# Patient Record
Sex: FEMALE | Race: WHITE | NOT HISPANIC OR LATINO | Employment: FULL TIME | URBAN - METROPOLITAN AREA
[De-identification: names, ages, dates, MRNs, and addresses within clinical notes are randomized per-mention and may not be internally consistent; named-entity substitution may affect disease eponyms.]

---

## 2019-01-23 ENCOUNTER — HOSPITAL ENCOUNTER (OUTPATIENT)
Dept: MAMMOGRAPHY | Facility: HOSPITAL | Age: 44
Discharge: HOME | End: 2019-01-23
Attending: OBSTETRICS & GYNECOLOGY

## 2019-02-22 ENCOUNTER — HOSPITAL ENCOUNTER (OUTPATIENT)
Dept: OTHER | Facility: HOSPITAL | Age: 44
Discharge: HOME | End: 2019-02-22

## 2019-02-22 LAB
ALBUMIN SERPL-MCNC: 4.2 G/DL (ref 3.5–5)
ALBUMIN/GLOB SERPL: 1.6 {RATIO} (ref 1.4–2.6)
ALP SERPL-CCNC: 33 U/L (ref 42–98)
ALT SERPL-CCNC: 8 U/L (ref 10–40)
ANION GAP SERPL CALC-SCNC: 14 MMOL/L (ref 8–19)
AST SERPL-CCNC: 13 U/L (ref 15–50)
BASOPHILS # BLD AUTO: 0.03 10*3/UL (ref 0–0.2)
BASOPHILS NFR BLD AUTO: 0.4 % (ref 0–3)
BILIRUB SERPL-MCNC: 0.64 MG/DL (ref 0.2–1.3)
BUN SERPL-MCNC: 11 MG/DL (ref 5–25)
BUN/CREAT SERPL: 14 {RATIO} (ref 6–20)
CALCIUM SERPL-MCNC: 8.9 MG/DL (ref 8.7–10.4)
CHLORIDE SERPL-SCNC: 103 MMOL/L (ref 99–111)
CHOLEST SERPL-MCNC: 173 MG/DL (ref 107–200)
CHOLEST/HDLC SERPL: 2.7 {RATIO} (ref 3–6)
CONV ABS IMM GRAN: 0.01 10*3/UL (ref 0–0.2)
CONV CO2: 25 MMOL/L (ref 22–32)
CONV IMMATURE GRAN: 0.1 % (ref 0–1.8)
CONV TOTAL PROTEIN: 6.9 G/DL (ref 6.3–8.2)
CREAT UR-MCNC: 0.79 MG/DL (ref 0.5–0.9)
DEPRECATED RDW RBC AUTO: 39.5 FL (ref 36.4–46.3)
EOSINOPHIL # BLD AUTO: 0.17 10*3/UL (ref 0–0.7)
EOSINOPHIL # BLD AUTO: 2.4 % (ref 0–7)
ERYTHROCYTE [DISTWIDTH] IN BLOOD BY AUTOMATED COUNT: 11.6 % (ref 11.7–14.4)
GFR SERPLBLD BASED ON 1.73 SQ M-ARVRAT: >60 ML/MIN/{1.73_M2}
GLOBULIN UR ELPH-MCNC: 2.7 G/DL (ref 2–3.5)
GLUCOSE SERPL-MCNC: 95 MG/DL (ref 65–99)
HBA1C MFR BLD: 13.9 G/DL (ref 12–16)
HCT VFR BLD AUTO: 41.6 % (ref 37–47)
HDLC SERPL-MCNC: 63 MG/DL (ref 40–60)
LDLC SERPL CALC-MCNC: 90 MG/DL (ref 70–100)
LYMPHOCYTES # BLD AUTO: 2.18 10*3/UL (ref 1–5)
MCH RBC QN AUTO: 30.8 PG (ref 27–31)
MCHC RBC AUTO-ENTMCNC: 33.4 G/DL (ref 33–37)
MCV RBC AUTO: 92.2 FL (ref 81–99)
MONOCYTES # BLD AUTO: 0.57 10*3/UL (ref 0.2–1.2)
MONOCYTES NFR BLD AUTO: 8.2 % (ref 3–10)
NEUTROPHILS # BLD AUTO: 4 10*3/UL (ref 2–8)
NEUTROPHILS NFR BLD AUTO: 57.6 % (ref 30–85)
NRBC CBCN: 0 % (ref 0–0.7)
OSMOLALITY SERPL CALC.SUM OF ELEC: 285 MOSM/KG (ref 273–304)
PLATELET # BLD AUTO: 300 10*3/UL (ref 130–400)
PMV BLD AUTO: 10.2 FL (ref 9.4–12.3)
POTASSIUM SERPL-SCNC: 3.8 MMOL/L (ref 3.5–5.3)
RBC # BLD AUTO: 4.51 10*6/UL (ref 4.2–5.4)
SODIUM SERPL-SCNC: 138 MMOL/L (ref 135–147)
TRIGL SERPL-MCNC: 102 MG/DL (ref 40–150)
TSH SERPL-ACNC: 2.55 M[IU]/L (ref 0.27–4.2)
VARIANT LYMPHS NFR BLD MANUAL: 31.3 % (ref 20–45)
VLDLC SERPL-MCNC: 20 MG/DL (ref 5–37)
WBC # BLD AUTO: 6.96 10*3/UL (ref 4.8–10.8)

## 2019-03-07 ENCOUNTER — HOSPITAL ENCOUNTER (OUTPATIENT)
Dept: OTHER | Facility: HOSPITAL | Age: 44
Discharge: HOME | End: 2019-03-07
Attending: NURSE PRACTITIONER

## 2020-02-07 ENCOUNTER — HOSPITAL ENCOUNTER (OUTPATIENT)
Dept: OTHER | Facility: HOSPITAL | Age: 45
Discharge: HOME | End: 2020-02-07

## 2020-02-07 LAB
ALBUMIN SERPL-MCNC: 4.4 G/DL (ref 3.5–5)
ALBUMIN/GLOB SERPL: 1.5 {RATIO} (ref 1.4–2.6)
ALP SERPL-CCNC: 46 U/L (ref 42–98)
ALT SERPL-CCNC: 17 U/L (ref 10–40)
ANION GAP SERPL CALC-SCNC: 18 MMOL/L (ref 8–19)
AST SERPL-CCNC: 16 U/L (ref 15–50)
BASOPHILS # BLD AUTO: 0.06 10*3/UL (ref 0–0.2)
BASOPHILS NFR BLD AUTO: 0.8 % (ref 0–3)
BILIRUB SERPL-MCNC: 0.6 MG/DL (ref 0.2–1.3)
BUN SERPL-MCNC: 12 MG/DL (ref 5–25)
BUN/CREAT SERPL: 13 {RATIO} (ref 6–20)
CALCIUM SERPL-MCNC: 9.8 MG/DL (ref 8.7–10.4)
CHLORIDE SERPL-SCNC: 100 MMOL/L (ref 99–111)
CHOLEST SERPL-MCNC: 151 MG/DL (ref 107–200)
CHOLEST/HDLC SERPL: 2.3 {RATIO} (ref 3–6)
CONV ABS IMM GRAN: 0.03 10*3/UL (ref 0–0.2)
CONV CO2: 25 MMOL/L (ref 22–32)
CONV IMMATURE GRAN: 0.4 % (ref 0–1.8)
CONV TOTAL PROTEIN: 7.3 G/DL (ref 6.3–8.2)
CREAT UR-MCNC: 0.94 MG/DL (ref 0.5–0.9)
DEPRECATED RDW RBC AUTO: 38.5 FL (ref 36.4–46.3)
EOSINOPHIL # BLD AUTO: 0.24 10*3/UL (ref 0–0.7)
EOSINOPHIL # BLD AUTO: 3.3 % (ref 0–7)
ERYTHROCYTE [DISTWIDTH] IN BLOOD BY AUTOMATED COUNT: 11.5 % (ref 11.7–14.4)
GFR SERPLBLD BASED ON 1.73 SQ M-ARVRAT: >60 ML/MIN/{1.73_M2}
GLOBULIN UR ELPH-MCNC: 2.9 G/DL (ref 2–3.5)
GLUCOSE SERPL-MCNC: 89 MG/DL (ref 65–99)
HCT VFR BLD AUTO: 42.4 % (ref 37–47)
HDLC SERPL-MCNC: 66 MG/DL (ref 40–60)
HGB BLD-MCNC: 14 G/DL (ref 12–16)
LDLC SERPL CALC-MCNC: 70 MG/DL (ref 70–100)
LYMPHOCYTES # BLD AUTO: 2.88 10*3/UL (ref 1–5)
LYMPHOCYTES NFR BLD AUTO: 39.2 % (ref 20–45)
MCH RBC QN AUTO: 29.9 PG (ref 27–31)
MCHC RBC AUTO-ENTMCNC: 33 G/DL (ref 33–37)
MCV RBC AUTO: 90.6 FL (ref 81–99)
MONOCYTES # BLD AUTO: 0.63 10*3/UL (ref 0.2–1.2)
MONOCYTES NFR BLD AUTO: 8.6 % (ref 3–10)
NEUTROPHILS # BLD AUTO: 3.5 10*3/UL (ref 2–8)
NEUTROPHILS NFR BLD AUTO: 47.7 % (ref 30–85)
NRBC CBCN: 0 % (ref 0–0.7)
OSMOLALITY SERPL CALC.SUM OF ELEC: 287 MOSM/KG (ref 273–304)
PLATELET # BLD AUTO: 336 10*3/UL (ref 130–400)
PMV BLD AUTO: 10.7 FL (ref 9.4–12.3)
POTASSIUM SERPL-SCNC: 4 MMOL/L (ref 3.5–5.3)
RBC # BLD AUTO: 4.68 10*6/UL (ref 4.2–5.4)
SODIUM SERPL-SCNC: 139 MMOL/L (ref 135–147)
TRIGL SERPL-MCNC: 75 MG/DL (ref 40–150)
TSH SERPL-ACNC: 1.79 M[IU]/L (ref 0.27–4.2)
VLDLC SERPL-MCNC: 15 MG/DL (ref 5–37)
WBC # BLD AUTO: 7.34 10*3/UL (ref 4.8–10.8)

## 2020-05-14 ENCOUNTER — HOSPITAL ENCOUNTER (OUTPATIENT)
Dept: OTHER | Facility: HOSPITAL | Age: 45
Discharge: HOME | End: 2020-05-14
Attending: NURSE PRACTITIONER

## 2020-05-14 LAB
25(OH)D3 SERPL-MCNC: 34.6 NG/ML (ref 30–100)
FERRITIN SERPL-MCNC: 24 NG/ML (ref 10–200)
FOLATE SERPL-MCNC: 14.2 NG/ML (ref 4.8–20)
FSH SERPL-ACNC: 3 M[IU]/ML
IRON SATN MFR SERPL: 13 % (ref 20–55)
IRON SERPL-MCNC: 51 UG/DL (ref 60–170)
LH SERPL-ACNC: 1.5 M[IU]/ML
TIBC SERPL-MCNC: 408 UG/DL (ref 245–450)
TRANSFERRIN SERPL-MCNC: 285 MG/DL (ref 250–380)
TSH SERPL-ACNC: 1.92 M[IU]/L (ref 0.27–4.2)
VIT B12 SERPL-MCNC: 349 PG/ML (ref 211–911)

## 2020-05-15 LAB — ESTRADIOL SERPL HS-MCNC: 237 PG/ML

## 2020-07-02 ENCOUNTER — HOSPITAL ENCOUNTER (OUTPATIENT)
Dept: MAMMOGRAPHY | Facility: HOSPITAL | Age: 45
Discharge: HOME | End: 2020-07-02
Attending: NURSE PRACTITIONER

## 2020-08-03 ENCOUNTER — HOSPITAL ENCOUNTER (OUTPATIENT)
Dept: OTHER | Facility: HOSPITAL | Age: 45
Discharge: HOME | End: 2020-08-03
Attending: NURSE PRACTITIONER

## 2020-08-03 ENCOUNTER — CONVERSION ENCOUNTER (OUTPATIENT)
Dept: FAMILY MEDICINE CLINIC | Age: 45
End: 2020-08-03

## 2020-08-04 ENCOUNTER — CONVERSION ENCOUNTER (OUTPATIENT)
Dept: FAMILY MEDICINE CLINIC | Age: 45
End: 2020-08-04

## 2020-08-04 LAB — SARS-COV-2 RNA SPEC QL NAA+PROBE: NOT DETECTED

## 2020-08-05 ENCOUNTER — HOSPITAL ENCOUNTER (OUTPATIENT)
Dept: OTHER | Facility: HOSPITAL | Age: 45
Discharge: HOME | End: 2020-08-05
Attending: NURSE PRACTITIONER

## 2020-08-05 LAB — SARS-COV-2 RNA SPEC QL NAA+PROBE: NOT DETECTED

## 2020-12-24 ENCOUNTER — HOSPITAL ENCOUNTER (OUTPATIENT)
Dept: OTHER | Facility: HOSPITAL | Age: 45
Discharge: HOME | End: 2020-12-24
Attending: INTERNAL MEDICINE

## 2021-01-05 ENCOUNTER — OFFICE VISIT CONVERTED (OUTPATIENT)
Dept: GASTROENTEROLOGY | Facility: CLINIC | Age: 46
End: 2021-01-05
Attending: NURSE PRACTITIONER

## 2021-01-14 ENCOUNTER — HOSPITAL ENCOUNTER (OUTPATIENT)
Dept: OTHER | Facility: HOSPITAL | Age: 46
Discharge: HOME | End: 2021-01-14
Attending: NURSE PRACTITIONER

## 2021-01-16 LAB — SARS-COV-2 RNA SPEC QL NAA+PROBE: NOT DETECTED

## 2021-01-25 ENCOUNTER — HOSPITAL ENCOUNTER (OUTPATIENT)
Dept: PREADMISSION TESTING | Facility: HOSPITAL | Age: 46
Discharge: HOME | End: 2021-01-25
Attending: OBSTETRICS & GYNECOLOGY

## 2021-01-26 LAB — SARS-COV-2 RNA SPEC QL NAA+PROBE: NOT DETECTED

## 2021-01-29 ENCOUNTER — HOSPITAL ENCOUNTER (OUTPATIENT)
Dept: PERIOP | Facility: HOSPITAL | Age: 46
Setting detail: HOSPITAL OUTPATIENT SURGERY
Discharge: HOME | End: 2021-01-29
Attending: OBSTETRICS & GYNECOLOGY

## 2021-01-29 LAB — HCG UR QL: NEGATIVE

## 2021-02-05 ENCOUNTER — HOSPITAL ENCOUNTER (OUTPATIENT)
Dept: VACCINE CLINIC | Facility: HOSPITAL | Age: 46
Discharge: HOME | End: 2021-02-05
Attending: INTERNAL MEDICINE

## 2021-03-05 ENCOUNTER — HOSPITAL ENCOUNTER (OUTPATIENT)
Dept: OTHER | Facility: HOSPITAL | Age: 46
Discharge: HOME | End: 2021-03-05

## 2021-03-05 LAB
ALBUMIN SERPL-MCNC: 4.8 G/DL (ref 3.5–5)
ALBUMIN/GLOB SERPL: 1.8 {RATIO} (ref 1.4–2.6)
ALP SERPL-CCNC: 45 U/L (ref 42–98)
ALT SERPL-CCNC: 18 U/L (ref 10–40)
ANION GAP SERPL CALC-SCNC: 14 MMOL/L (ref 8–19)
AST SERPL-CCNC: 18 U/L (ref 15–50)
BASOPHILS # BLD AUTO: 0.05 10*3/UL (ref 0–0.2)
BASOPHILS NFR BLD AUTO: 0.6 % (ref 0–3)
BILIRUB SERPL-MCNC: 0.45 MG/DL (ref 0.2–1.3)
BUN SERPL-MCNC: 13 MG/DL (ref 5–25)
BUN/CREAT SERPL: 14 {RATIO} (ref 6–20)
CALCIUM SERPL-MCNC: 9.6 MG/DL (ref 8.7–10.4)
CHLORIDE SERPL-SCNC: 103 MMOL/L (ref 99–111)
CHOLEST SERPL-MCNC: 161 MG/DL (ref 107–200)
CHOLEST/HDLC SERPL: 2.6 {RATIO} (ref 3–6)
CONV ABS IMM GRAN: 0.03 10*3/UL (ref 0–0.2)
CONV CO2: 25 MMOL/L (ref 22–32)
CONV IMMATURE GRAN: 0.4 % (ref 0–1.8)
CONV TOTAL PROTEIN: 7.5 G/DL (ref 6.3–8.2)
CREAT UR-MCNC: 0.96 MG/DL (ref 0.5–0.9)
DEPRECATED RDW RBC AUTO: 39.7 FL (ref 36.4–46.3)
EOSINOPHIL # BLD AUTO: 0.15 10*3/UL (ref 0–0.7)
EOSINOPHIL # BLD AUTO: 1.9 % (ref 0–7)
ERYTHROCYTE [DISTWIDTH] IN BLOOD BY AUTOMATED COUNT: 11.9 % (ref 11.7–14.4)
GFR SERPLBLD BASED ON 1.73 SQ M-ARVRAT: >60 ML/MIN/{1.73_M2}
GLOBULIN UR ELPH-MCNC: 2.7 G/DL (ref 2–3.5)
GLUCOSE SERPL-MCNC: 87 MG/DL (ref 65–99)
HCT VFR BLD AUTO: 41.9 % (ref 37–47)
HDLC SERPL-MCNC: 63 MG/DL (ref 40–60)
HGB BLD-MCNC: 14 G/DL (ref 12–16)
LDLC SERPL CALC-MCNC: 83 MG/DL (ref 70–100)
LYMPHOCYTES # BLD AUTO: 2.32 10*3/UL (ref 1–5)
LYMPHOCYTES NFR BLD AUTO: 29 % (ref 20–45)
MCH RBC QN AUTO: 30.3 PG (ref 27–31)
MCHC RBC AUTO-ENTMCNC: 33.4 G/DL (ref 33–37)
MCV RBC AUTO: 90.7 FL (ref 81–99)
MONOCYTES # BLD AUTO: 0.78 10*3/UL (ref 0.2–1.2)
MONOCYTES NFR BLD AUTO: 9.7 % (ref 3–10)
NEUTROPHILS # BLD AUTO: 4.68 10*3/UL (ref 2–8)
NEUTROPHILS NFR BLD AUTO: 58.4 % (ref 30–85)
NRBC CBCN: 0 % (ref 0–0.7)
OSMOLALITY SERPL CALC.SUM OF ELEC: 285 MOSM/KG (ref 273–304)
PLATELET # BLD AUTO: 304 10*3/UL (ref 130–400)
PMV BLD AUTO: 10.3 FL (ref 9.4–12.3)
POTASSIUM SERPL-SCNC: 4.4 MMOL/L (ref 3.5–5.3)
RBC # BLD AUTO: 4.62 10*6/UL (ref 4.2–5.4)
SODIUM SERPL-SCNC: 138 MMOL/L (ref 135–147)
TRIGL SERPL-MCNC: 75 MG/DL (ref 40–150)
TSH SERPL-ACNC: 1.45 M[IU]/L (ref 0.27–4.2)
VLDLC SERPL-MCNC: 15 MG/DL (ref 5–37)
WBC # BLD AUTO: 8.01 10*3/UL (ref 4.8–10.8)

## 2021-05-11 NOTE — H&P
History and Physical      Patient Name: Conchita Page   Patient ID: [id number]   Sex: Female   YOB: 1975    Primary Care Provider: Luisa PAGAN   Referring Provider: Luisa PAGAN    Visit Date: January 5, 2021    Provider: LATASHA Velasco   Location: [facility]   Location Address: [address] [place] [address], [address]  [place], [place]  [id number]   Location Phone: [phone]          Chief Complaint  · Dysphagia  · GERD      History Of Present Illness  The patient is a 45 year old /White female who presents on referral from Luisa PAGAN for a gastroenterology evaluation.      She presents for evaluation of dysphagia.  States that this has been present for over 10 years.      Dysphagia is worse with meats and breads.  States that this takes her breath when this occurs.  Admits regurgitation of food on two occasions.      Admits frequent heartburn.  This is improved since taking omeprazole and protonix routinely.  Has also noticed hoarseness of voice. She's now taking protonix 40 mg and then Pepcid 20 mg PRN.      Admits some nausea since having COVID vaccine.  She describes this to be constant.  Improved with anything.      Reports an increase in belching that's worse at bedtime.  States that she often regurgitates a white foamy substance.      Evaluated in the ER previously and prescribed Carafate.      Denies previous EGD.    Family medical history - mother with esophageal stricture.    6/4/2020 right upper quadrant ultrasound-small cyst in the right hepatic lobe.  Contracted gallbladder.  Normal common bile duct.         Past Medical History  Dysphagia; Fracture; Heel pain, bilateral; Numbness in feet; Vomiting alone         Past Surgical History  Appendectomy; Back surgery; C section         Medication List  [place] 24 Fe 1 mg-20 mcg (24)/75 mg (4) oral tablet; omeprazole 20 mg oral capsule,delayed release(DR/EC); Protonix 40 mg oral  "tablet,delayed release (DR/EC)         Allergy List  NO KNOWN DRUG ALLERGIES; Shellfish allergy         Family Medical History  Lung cancer         Social History  Alcohol (Never); Tobacco (Never)         Review of Systems  · Constitutional  o Denies  o : chills, fever  · Eyes  o Denies  o : blurred vision, changes in vision  · Cardiovascular  o Denies  o : chest pain, irregular heart beats  · Respiratory  o Denies  o : shortness of breath, cough  · Gastrointestinal  o Admits  o : See HPI  · Genitourinary  o Denies  o : dysuria, blood in urine  · Integument  o Denies  o : rash, new skin lesions  · Neurologic  o Denies  o : tingling or numbness, seizures  · Musculoskeletal  o Denies  o : joint pain, joint swelling  · Endocrine  o Denies  o : weight gain, weight loss  · Psychiatric  o Denies  o : anxiety, depression      Vitals  Date Time BP Position Site L\R Cuff Size HR RR TEMP (F) WT  HT  BMI kg/m2 BSA m2 O2 Sat FR L/min FiO2 HC       01/05/2021 01:25 /74 Sitting    73 - R  97.3 128lbs 0oz 5'  4\" 21.97 1.62             Physical Examination  · Constitutional  o Appearance  o : well developed, well-nourished, in no acute distress  · Eyes  o Vision  o :   § Visual Fields  § : eyes move symmetrical in all directions  o Sclerae  o : anicteric  o Pupils and Irises  o : pupils equal and symmetrical  · Neck  o Inspection/Palpation  o : supple  · Respiratory  o Respiratory Effort  o : breathing unlabored  o Inspection of Chest  o : normal appearance, no retractions  o Auscultation of Lungs  o : clear to auscultation bilaterally  · Cardiovascular  o Heart  o :   § Auscultation of Heart  § : no murmurs, gallops or rubs  · Gastrointestinal  o Abdominal Examination  o : soft, nontender to palpation, with normal active bowel sounds, no appreciable hepatosplenomegaly  o Digital Rectal Exam  o : deferred  · Lymphatic  o Neck  o : no palpable lymphadenopathy  · Skin and Subcutaneous Tissue  o General Inspection  o : without " focal lesions; turgor is normal  · Psychiatric  o General  o : Alert and oriented x3  o Mood and Affect  o : Mood and affect are appropriate to circumstances  · Extremities  o Extremities  o : No edema, no cyanosis          Assessment  · Pre-op testing     V72.84/Z01.818  · Pharyngeal dysphagia     787.23/R13.13  · Heartburn     787.1/R12  · Nausea alone     787.02/R11.0      Plan  · Orders  o [facility] Pre-Op Covid-19 Screening (35007) - - 01/05/2021  o Consent for Esophagogastroduodenoscopy (EGD) - Possible risks/complications, benefits, and alternatives to surgical or invasive procedure have been explained to patient and/or legal guardian. - Patient has been evaluated and can tolerate anesthesia and/or sedation. Risks, benefits, and alternatives to anesthesia and sedation have been explained to patient and/or legal guardian. (83876) - - 01/05/2021  · Medications  o Levsin/SL 0.125 mg sublingual tablet, sublingual   SIG: place 1 tablet under tongue by translingual route 2 times a day as needed   DISP: (60) Tablet with 2 refills  Prescribed on 01/05/2021     o Protonix 40 mg oral tablet,delayed release (DR/EC)   SIG: take 1 tablet by oral route daily   DISP: (90) Tablet with 1 refills  Prescribed on 01/05/2021     o Medications have been Reconciled  · Instructions  o Handouts provided: Pre-procedure instructions including date and time and location of procedure.  o PLAN: Proceed with procedure. Patient understands risks and benefits and is willing to proceed. Understands the risks include, but are not limited to, bleeding and/or perforation.  o Information given on current diagnoses.  o Electronically Identified Patient Education Materials Provided Electronically  · Disposition  o Follow Up after procedure            Electronically Signed by: LATASHA Velasco -Author on January 6, 2021 04:57:19 PM

## 2021-05-14 VITALS
DIASTOLIC BLOOD PRESSURE: 74 MMHG | HEIGHT: 64 IN | HEART RATE: 73 BPM | WEIGHT: 128 LBS | SYSTOLIC BLOOD PRESSURE: 111 MMHG | TEMPERATURE: 97.3 F | BODY MASS INDEX: 21.85 KG/M2

## 2021-07-02 VITALS — TEMPERATURE: 98 F | TEMPERATURE: 98.1 F

## 2021-07-21 ENCOUNTER — PREP FOR SURGERY (OUTPATIENT)
Dept: OTHER | Facility: HOSPITAL | Age: 46
End: 2021-07-21

## 2021-07-21 DIAGNOSIS — R12 HEARTBURN: ICD-10-CM

## 2021-07-21 DIAGNOSIS — R11.0 NAUSEA: ICD-10-CM

## 2021-07-21 DIAGNOSIS — R13.13 PHARYNGEAL DYSPHAGIA: Primary | ICD-10-CM

## 2021-07-27 PROBLEM — R12 HEARTBURN: Status: ACTIVE | Noted: 2021-07-27

## 2021-07-27 PROBLEM — R13.13 PHARYNGEAL DYSPHAGIA: Status: ACTIVE | Noted: 2021-07-27

## 2021-07-27 PROBLEM — R11.0 NAUSEA: Status: ACTIVE | Noted: 2021-07-27

## 2021-08-04 RX ORDER — PANTOPRAZOLE SODIUM 40 MG/1
40 TABLET, DELAYED RELEASE ORAL 2 TIMES DAILY
Qty: 90 TABLET | Refills: 1 | Status: SHIPPED | OUTPATIENT
Start: 2021-08-04 | End: 2022-03-14 | Stop reason: SDUPTHER

## 2021-08-04 NOTE — TELEPHONE ENCOUNTER
Patient would like to be put back on Protonix 40 mg as she is not getting any relief from omeprazole and her scopes are not for another couple of months, pharmacy entered and order pended.

## 2021-08-09 NOTE — PRE-PROCEDURE INSTRUCTIONS
Pt. Instructed on NPO , pre-op meds. Ok to take protonix a.m. of procedure.     Pt. Fully vaccinated - had it done at [facility]

## 2021-08-10 ENCOUNTER — HOSPITAL ENCOUNTER (OUTPATIENT)
Facility: HOSPITAL | Age: 46
Setting detail: HOSPITAL OUTPATIENT SURGERY
Discharge: HOME | End: 2021-08-10
Attending: INTERNAL MEDICINE | Admitting: INTERNAL MEDICINE

## 2021-08-10 ENCOUNTER — ANESTHESIA (OUTPATIENT)
Dept: GASTROENTEROLOGY | Facility: HOSPITAL | Age: 46
End: 2021-08-10

## 2021-08-10 ENCOUNTER — ANESTHESIA EVENT (OUTPATIENT)
Dept: GASTROENTEROLOGY | Facility: HOSPITAL | Age: 46
End: 2021-08-10

## 2021-08-10 VITALS
HEART RATE: 78 BPM | OXYGEN SATURATION: 98 % | DIASTOLIC BLOOD PRESSURE: 73 MMHG | WEIGHT: 130.07 LBS | TEMPERATURE: 98.2 F | RESPIRATION RATE: 15 BRPM | SYSTOLIC BLOOD PRESSURE: 103 MMHG | HEIGHT: 64 IN | BODY MASS INDEX: 22.21 KG/M2

## 2021-08-10 DIAGNOSIS — R13.13 PHARYNGEAL DYSPHAGIA: ICD-10-CM

## 2021-08-10 DIAGNOSIS — R11.0 NAUSEA: ICD-10-CM

## 2021-08-10 DIAGNOSIS — R12 HEARTBURN: ICD-10-CM

## 2021-08-10 LAB — B-HCG UR QL: NEGATIVE

## 2021-08-10 PROCEDURE — C1726 CATH, BAL DIL, NON-VASCULAR: HCPCS | Performed by: INTERNAL MEDICINE

## 2021-08-10 PROCEDURE — 88305 TISSUE EXAM BY PATHOLOGIST: CPT | Performed by: INTERNAL MEDICINE

## 2021-08-10 PROCEDURE — 43249 ESOPH EGD DILATION <30 MM: CPT | Performed by: INTERNAL MEDICINE

## 2021-08-10 PROCEDURE — 81025 URINE PREGNANCY TEST: CPT | Performed by: ANESTHESIOLOGY

## 2021-08-10 PROCEDURE — 43239 EGD BIOPSY SINGLE/MULTIPLE: CPT | Performed by: INTERNAL MEDICINE

## 2021-08-10 PROCEDURE — 25010000002 PROPOFOL 10 MG/ML EMULSION: Performed by: NURSE ANESTHETIST, CERTIFIED REGISTERED

## 2021-08-10 RX ORDER — PROPOFOL 10 MG/ML
VIAL (ML) INTRAVENOUS AS NEEDED
Status: DISCONTINUED | OUTPATIENT
Start: 2021-08-10 | End: 2021-08-10 | Stop reason: SURG

## 2021-08-10 RX ORDER — LIDOCAINE HYDROCHLORIDE 20 MG/ML
INJECTION, SOLUTION INFILTRATION; PERINEURAL AS NEEDED
Status: DISCONTINUED | OUTPATIENT
Start: 2021-08-10 | End: 2021-08-10 | Stop reason: SURG

## 2021-08-10 RX ORDER — SODIUM CHLORIDE, SODIUM LACTATE, POTASSIUM CHLORIDE, CALCIUM CHLORIDE 600; 310; 30; 20 MG/100ML; MG/100ML; MG/100ML; MG/100ML
30 INJECTION, SOLUTION INTRAVENOUS CONTINUOUS
Status: DISCONTINUED | OUTPATIENT
Start: 2021-08-10 | End: 2021-08-10 | Stop reason: HOSPADM

## 2021-08-10 RX ADMIN — SODIUM CHLORIDE, POTASSIUM CHLORIDE, SODIUM LACTATE AND CALCIUM CHLORIDE 30 ML/HR: 600; 310; 30; 20 INJECTION, SOLUTION INTRAVENOUS at 11:57

## 2021-08-10 RX ADMIN — PROPOFOL 100 MG: 10 INJECTION, EMULSION INTRAVENOUS at 13:15

## 2021-08-10 RX ADMIN — LIDOCAINE HYDROCHLORIDE 100 MG: 20 INJECTION, SOLUTION INFILTRATION; PERINEURAL at 13:15

## 2021-08-10 RX ADMIN — PROPOFOL 200 MCG/KG/MIN: 10 INJECTION, EMULSION INTRAVENOUS at 13:16

## 2021-08-10 NOTE — ANESTHESIA PREPROCEDURE EVALUATION
Anesthesia Evaluation     Patient summary reviewed and Nursing notes reviewed                Airway   Mallampati: I  TM distance: >3 FB  Neck ROM: full  No difficulty expected  Dental      Pulmonary - negative pulmonary ROS and normal exam    breath sounds clear to auscultation  Cardiovascular - negative cardio ROS and normal exam    Rhythm: regular        Neuro/Psych- negative ROS  GI/Hepatic/Renal/Endo - negative ROS     Musculoskeletal (-) negative ROS    Abdominal    Substance History - negative use     OB/GYN negative ob/gyn ROS         Other                        Anesthesia Plan    ASA 1     general     intravenous induction     Anesthetic plan, all risks, benefits, and alternatives have been provided, discussed and informed consent has been obtained with: patient.

## 2021-08-10 NOTE — H&P
"Pre Procedure History & Physical    Chief Complaint:   Dysphagia, heartburn, nausea    Subjective     HPI:   45 yo F here for eval of dysphagia, heartburn, and nausea.    Past Medical History:   Past Medical History:   Diagnosis Date   • Anxiety    • Dysphagia    • Fracture    • Heel pain, bilateral    • Numbness in feet    • PONV (postoperative nausea and vomiting)    • Spinal headache     post epidural   • Vomiting        Past Surgical History:  Past Surgical History:   Procedure Laterality Date   • APPENDECTOMY     • BACK SURGERY     •  SECTION         Family History:  Family History   Problem Relation Age of Onset   • Lung cancer Father    • Malig Hyperthermia Neg Hx        Social History:   reports that she has been smoking. She does not have any smokeless tobacco history on file. She reports current alcohol use. She reports that she does not use drugs.    Medications:   Medications Prior to Admission   Medication Sig Dispense Refill Last Dose   • pantoprazole (PROTONIX) 40 MG EC tablet Take 1 tablet by mouth 2 (Two) Times a Day. 90 tablet 1    • Desvenlafaxine Succinate ER 25 MG tablet sustained-release 24 hour Take 1 tablet by mouth Daily. 120 tablet 0        Allergies:  Adhesive tape    ROS:    Pertinent items are noted in HPI     Objective     Height 162.6 cm (64.02\"), weight 59 kg (130 lb 1.1 oz), last menstrual period 2021, not currently breastfeeding.    Physical Exam   Constitutional: Pt is oriented to person, place, and time and well-developed, well-nourished, and in no distress.   Mouth/Throat: Oropharynx is clear and moist.   Neck: Normal range of motion.   Cardiovascular: Normal rate, regular rhythm and normal heart sounds.    Pulmonary/Chest: Effort normal and breath sounds normal.   Abdominal: Soft. Nontender  Skin: Skin is warm and dry.   Psychiatric: Mood, memory, affect and judgment normal.     Assessment/Plan     Diagnosis:  Dysphagia, heartburn, nausea    Anticipated Surgical " Procedure:  EGD    The risks, benefits, and alternatives of this procedure have been discussed with the patient or the responsible party- the patient understands and agrees to proceed.

## 2021-08-10 NOTE — ANESTHESIA POSTPROCEDURE EVALUATION
Patient: Conchita Page    Procedure Summary     Date: 08/10/21 Room / Location: [facility] / [facility]    Anesthesia Start: 1313 Anesthesia Stop: 1337    Procedure: ESOPHAGOGASTRODUODENOSCOPY (N/A ) Diagnosis:       Pharyngeal dysphagia      Nausea      Heartburn      (Pharyngeal dysphagia [R13.13])      (Nausea [R11.0])      (Heartburn [R12])    Surgeons: Xiomara Frederick MD Provider: Eduardo Elizabeth MD    Anesthesia Type: general ASA Status: 1          Anesthesia Type: general    Vitals  Vitals Value Taken Time   BP 99/68 08/10/21 1340   Temp 36.8 °C (98.2 °F) 08/10/21 1335   Pulse 85 08/10/21 1340   Resp 18 08/10/21 1340   SpO2 99 % 08/10/21 1340           Post Anesthesia Care and Evaluation    Patient location during evaluation: bedside  Patient participation: complete - patient participated  Level of consciousness: awake  Pain management: adequate  Airway patency: patent  Anesthetic complications: No anesthetic complications  PONV Status: none  Cardiovascular status: acceptable  Respiratory status: acceptable  Hydration status: acceptable    Comments: An Anesthesiologist personally participated in the most demanding procedures (including induction and emergence if applicable) in the anesthesia plan, monitored the course of anesthesia administration at frequent intervals and remained physically present and available for immediate diagnosis and treatment of emergencies.

## 2021-08-12 LAB
CYTO UR: NORMAL
LAB AP CASE REPORT: NORMAL
LAB AP CLINICAL INFORMATION: NORMAL
PATH REPORT.FINAL DX SPEC: NORMAL
PATH REPORT.GROSS SPEC: NORMAL

## 2021-08-13 DIAGNOSIS — R13.13 PHARYNGEAL DYSPHAGIA: Primary | ICD-10-CM

## 2021-08-16 ENCOUNTER — TELEPHONE (OUTPATIENT)
Dept: GASTROENTEROLOGY | Facility: CLINIC | Age: 46
End: 2021-08-16

## 2021-08-16 NOTE — TELEPHONE ENCOUNTER
----- Message from LATASHA Kaur sent at 8/13/2021 12:09 PM EDT -----  Biopsies are consistent with reflux esophagitis. Recommend barium esophagram to evaluate motility of esophagus. Please schedule and schedule for office follow-up after.

## 2021-08-31 ENCOUNTER — HOSPITAL ENCOUNTER (OUTPATIENT)
Dept: GENERAL RADIOLOGY | Facility: HOSPITAL | Age: 46
Discharge: HOME | End: 2021-08-31
Admitting: NURSE PRACTITIONER

## 2021-08-31 DIAGNOSIS — R13.13 PHARYNGEAL DYSPHAGIA: ICD-10-CM

## 2021-08-31 PROCEDURE — 74220 X-RAY XM ESOPHAGUS 1CNTRST: CPT

## 2021-09-02 ENCOUNTER — TELEPHONE (OUTPATIENT)
Dept: GASTROENTEROLOGY | Facility: CLINIC | Age: 46
End: 2021-09-02

## 2021-09-02 DIAGNOSIS — K22.0 ACHALASIA: Primary | ICD-10-CM

## 2021-09-02 NOTE — TELEPHONE ENCOUNTER
----- Message from Xiomara Frederick MD sent at 9/1/2021  9:29 PM EDT -----  Esophagram suggestive of achalasia.  Would recommend EGD with Botox if patient agreeable.  OK to work her into the schedule sometime this month.  I had discussed my suspicions with patient after her initial EGD.  If she would like to hold off on Botox, please arrange f/u appointment.

## 2021-09-02 NOTE — TELEPHONE ENCOUNTER
Ms. Page returned my call and I discussed your recommendations with her. She did have a few questions. How long does it last? How often would she need to have it done? What are her other options? I can schedule her a follow up to discuss this however the next available with you is in March. Would you like to work her in sooner?

## 2021-09-03 ENCOUNTER — TELEPHONE (OUTPATIENT)
Dept: GASTROENTEROLOGY | Facility: CLINIC | Age: 46
End: 2021-09-03

## 2021-09-03 NOTE — TELEPHONE ENCOUNTER
I talked with Ms. Page this evening about her options.  Please arrange referral to Dr. Anibal Fofana with  Thoracic Surgery.  Please send referral to his new office as he is no longer at the [facility].

## 2021-09-03 NOTE — TELEPHONE ENCOUNTER
----- Message from LATASHA Kaur sent at 8/31/2021  1:03 PM EDT -----  Please let patient know that esophagram shows an abnormality of the esophagus called achalasia where the lower esophagus does not completely relax.  She will need esophageal manometry to further diagnose this and determine treatment options.  Please schedule as soon as possible.

## 2021-09-08 ENCOUNTER — PATIENT MESSAGE (OUTPATIENT)
Dept: GASTROENTEROLOGY | Facility: CLINIC | Age: 46
End: 2021-09-08

## 2021-10-01 ENCOUNTER — TRANSCRIBE ORDERS (OUTPATIENT)
Dept: ADMINISTRATIVE | Facility: HOSPITAL | Age: 46
End: 2021-10-01

## 2021-10-01 DIAGNOSIS — K22.0 ACHALASIA: Primary | ICD-10-CM

## 2021-11-12 ENCOUNTER — OFFICE VISIT (OUTPATIENT)
Dept: INTERNAL MEDICINE | Facility: CLINIC | Age: 46
End: 2021-11-12

## 2021-11-12 VITALS
OXYGEN SATURATION: 99 % | HEART RATE: 80 BPM | BODY MASS INDEX: 22.09 KG/M2 | TEMPERATURE: 98.1 F | SYSTOLIC BLOOD PRESSURE: 100 MMHG | HEIGHT: 64 IN | WEIGHT: 129.4 LBS | DIASTOLIC BLOOD PRESSURE: 68 MMHG

## 2021-11-12 DIAGNOSIS — K22.0 ACHALASIA OF DIGESTIVE TRACT: ICD-10-CM

## 2021-11-12 DIAGNOSIS — F41.9 ANXIETY: ICD-10-CM

## 2021-11-12 DIAGNOSIS — R51.9 NONINTRACTABLE EPISODIC HEADACHE, UNSPECIFIED HEADACHE TYPE: Primary | ICD-10-CM

## 2021-11-12 PROCEDURE — 99204 OFFICE O/P NEW MOD 45 MIN: CPT | Performed by: NURSE PRACTITIONER

## 2021-11-12 RX ORDER — VORTIOXETINE 5 MG/1
5 TABLET, FILM COATED ORAL
Qty: 90 TABLET | Refills: 0 | Status: SHIPPED | OUTPATIENT
Start: 2021-11-12 | End: 2021-12-06

## 2021-11-12 NOTE — ASSESSMENT & PLAN NOTE
"We will get MRI.  Red flags of posterior headache pain and worsening in the last 2 months.  Discussed that if MRI is normal, may proceed with preventative medication for migraine.  Discussed Emgality.  May also try Nurtec for abortive therapy.  Discussed ddx for headaches.  Discussed risk for rebound headache with frequent use of NSAID/caffeine.  Encouraged patient to keep a headache diary.  Patient will go to the ER with \" worst headache of life\", vision loss, unilateral weakness, slurred speech, confusion, or syncope.    "

## 2021-11-12 NOTE — ASSESSMENT & PLAN NOTE
Discussed symptoms and counseled at length regarding anxiety.  We will try Trintellix.  Discussed risk/side effects associated with SSRI--weight gain, decreased libido, worsening depression and SI.  Patient verbalized understanding of risk and will call with worsening depression or SI or seek treatment emergently.  Also discussed counseling as an option.  Also discussed option for Strattera if symptoms do not improve with Trintellix.

## 2021-11-12 NOTE — PATIENT INSTRUCTIONS
Mindfulness-Based Stress Reduction  Mindfulness-based stress reduction (MBSR) is a program that helps people learn to practice mindfulness. Mindfulness is the practice of intentionally paying attention to the present moment. It can be learned and practiced through techniques such as education, breathing exercises, meditation, and yoga. MBSR includes several mindfulness techniques in one program.  MBSR works best when you understand the treatment, are willing to try new things, and can commit to spending time practicing what you learn. MBSR training may include learning about:  · How your emotions, thoughts, and reactions affect your body.  · New ways to respond to things that cause negative thoughts to start (triggers).  · How to notice your thoughts and let go of them.  · Practicing awareness of everyday things that you normally do without thinking.  · The techniques and goals of different types of meditation.  What are the benefits of MBSR?  MBSR can have many benefits, which include helping you to:  · Develop self-awareness. This refers to knowing and understanding yourself.  · Learn skills and attitudes that help you to participate in your own health care.  · Learn new ways to care for yourself.  · Be more accepting about how things are, and let things go.  · Be less judgmental and approach things with an open mind.  · Be patient with yourself and trust yourself more.  MBSR has also been shown to:  · Reduce negative emotions, such as depression and anxiety.  · Improve memory and focus.  · Change how you sense and approach pain.  · Boost your body's ability to fight infections.  · Help you connect better with other people.  · Improve your sense of well-being.  Follow these instructions at home:    · Find a local in-person or online MBSR program.  · Set aside some time regularly for mindfulness practice.  · Find a mindfulness practice that works best for you. This may include one or more of the  following:  ? Meditation. Meditation involves focusing your mind on a certain thought or activity.  ? Breathing awareness exercises. These help you to stay present by focusing on your breath.  ? Body scan. For this practice, you lie down and pay attention to each part of your body from head to toe. You can identify tension and soreness and intentionally relax parts of your body.  ? Yoga. Yoga involves stretching and breathing, and it can improve your ability to move and be flexible. It can also provide an experience of testing your body's limits, which can help you release stress.  ? Mindful eating. This way of eating involves focusing on the taste, texture, color, and smell of each bite of food. Because this slows down eating and helps you feel full sooner, it can be an important part of a weight-loss plan.  · Find a podcast or recording that provides guidance for breathing awareness, body scan, or meditation exercises. You can listen to these any time when you have a free moment to rest without distractions.  · Follow your treatment plan as told by your health care provider. This may include taking regular medicines and making changes to your diet or lifestyle as recommended.  How to practice mindfulness  To do a basic awareness exercise:  · Find a comfortable place to sit.  · Pay attention to the present moment. Observe your thoughts, feelings, and surroundings just as they are.  · Avoid placing judgment on yourself, your feelings, or your surroundings. Make note of any judgment that comes up, and let it go.  · Your mind may wander, and that is okay. Make note of when your thoughts drift, and return your attention to the present moment.  To do basic mindfulness meditation:  · Find a comfortable place to sit. This may include a stable chair or a firm floor cushion.  ? Sit upright with your back straight. Let your arms fall next to your side with your hands resting on your legs.  ? If sitting in a chair, rest your  feet flat on the floor.  ? If sitting on a cushion, cross your legs in front of you.  · Keep your head in a neutral position with your chin dropped slightly. Relax your jaw and rest the tip of your tongue on the roof of your mouth. Drop your gaze to the floor. You can close your eyes if you like.  · Breathe normally and pay attention to your breath. Feel the air moving in and out of your nose. Feel your belly expanding and relaxing with each breath.  · Your mind may wander, and that is okay. Make note of when your thoughts drift, and return your attention to your breath.  · Avoid placing judgment on yourself, your feelings, or your surroundings. Make note of any judgment or feelings that come up, let them go, and bring your attention back to your breath.  · When you are ready, lift your gaze or open your eyes. Pay attention to how your body feels after the meditation.  Where to find more information  You can find more information about MBSR from:  · Your health care provider.  · Community-based meditation centers or programs.  · Programs offered near you.  Summary  · Mindfulness-based stress reduction (MBSR) is a program that teaches you how to intentionally pay attention to the present moment. It is used with other treatments to help you cope better with daily stress, emotions, and pain.  · MBSR focuses on developing self-awareness, which allows you to respond to life stress without judgment or negative emotions.  · MBSR programs may involve learning different mindfulness practices, such as breathing exercises, meditation, yoga, body scan, or mindful eating. Find a mindfulness practice that works best for you, and set aside time for it on a regular basis.  This information is not intended to replace advice given to you by your health care provider. Make sure you discuss any questions you have with your health care provider.  Document Revised: 02/22/2021 Document Reviewed: 04/26/2018  [employer]vier Patient Education ©  2021 [employer]

## 2021-11-12 NOTE — PROGRESS NOTES
"Chief Complaint  Establish Care, Anxiety, and Headache    Subjective          Conchita Page presents to [facility]  History of Present Illness  She has testing/surgery for achalasia planned at , referred by Dr. Otoniel Villalobos on Tuesday    She reports that her  is concerned about her headaches. She reports having headaches for a long time. She reports that these are becoming more severe.  She denies ever having work-up for her headaches previously despite discussing these with providers.  Headaches lasting 2 wks straight at times. She was seen by eye doctor, normal exam.   She reports posterior head always. She feels like these are signifcantly worse over the last 2 mths. She reports waking with headaches. denies aura. She feels like she also has trouble with memory. She reports taking excedrin, 2 times per week or less.     Generalized anxiety-previously on wellbutrin but made her very irritable  Denies feeling depressed  She reports that she does notice that she lacks motivation to do things that normally she enjoys doing.  She denies having any stressors currently.  She feels like work is not stressful, no difficulty with finances, good marriage.  Tried celexa previously but uncertain if it works  She feels like she is constantly in a haze. She feels like she cannot complete a task. Previously on adderall years ago.  She states that this helped some but caused her to lose quite a bit of weight.  She reports feeling overwhelmed.  She reports previously doing counseling but did not feel as if it helped. She reports that she has a good support system.   Objective   Vital Signs:   /68   Pulse 80   Temp 98.1 °F (36.7 °C) (Temporal)   Ht 162.6 cm (64.02\")   Wt 58.7 kg (129 lb 6.4 oz)   SpO2 99%   BMI 22.20 kg/m²     Physical Exam  Vitals and nursing note reviewed.   Constitutional:       General: She is not in acute distress.     Appearance: " "Normal appearance.   HENT:      Head: Normocephalic and atraumatic.      Right Ear: External ear normal.      Left Ear: External ear normal.      Nose: Nose normal.      Mouth/Throat:      Mouth: Mucous membranes are moist.   Eyes:      Conjunctiva/sclera: Conjunctivae normal.   Cardiovascular:      Rate and Rhythm: Normal rate and regular rhythm.      Pulses: Normal pulses.      Heart sounds: Normal heart sounds. No murmur heard.  No friction rub. No gallop.    Pulmonary:      Effort: Pulmonary effort is normal. No respiratory distress.      Breath sounds: No wheezing, rhonchi or rales.   Abdominal:      Palpations: Abdomen is soft.   Musculoskeletal:      Cervical back: Neck supple.      Right lower leg: No edema.      Left lower leg: No edema.   Skin:     General: Skin is warm and dry.   Neurological:      General: No focal deficit present.      Mental Status: She is alert and oriented to person, place, and time.   Psychiatric:         Mood and Affect: Mood normal.         Behavior: Behavior normal.        Result Review :          Procedures      Assessment and Plan    Diagnoses and all orders for this visit:    1. Nonintractable episodic headache, unspecified headache type (Primary)  Assessment & Plan:  We will get MRI.  Red flags of posterior headache pain and worsening in the last 2 months.  Discussed that if MRI is normal, may proceed with preventative medication for migraine.  Discussed Emgality.  May also try Nurtec for abortive therapy.  Discussed ddx for headaches.  Discussed risk for rebound headache with frequent use of NSAID/caffeine.  Encouraged patient to keep a headache diary.  Patient will go to the ER with \" worst headache of life\", vision loss, unilateral weakness, slurred speech, confusion, or syncope.      Orders:  -     MRI Brain With & Without Contrast; Future    2. Anxiety  Assessment & Plan:  Discussed symptoms and counseled at length regarding anxiety.  We will try Trintellix.  Discussed " risk/side effects associated with SSRI--weight gain, decreased libido, worsening depression and SI.  Patient verbalized understanding of risk and will call with worsening depression or SI or seek treatment emergently.  Also discussed counseling as an option.  Also discussed option for Strattera if symptoms do not improve with Trintellix.      3. Achalasia of digestive tract    Other orders  -     Vortioxetine HBr (Trintellix) 5 MG tablet; Take 5 mg by mouth Daily With Breakfast.  Dispense: 90 tablet; Refill: 0            Follow Up   Return in about 4 weeks (around 12/10/2021).  Patient was given instructions and counseling regarding her condition or for health maintenance advice. Please see specific information pulled into the AVS if appropriate.

## 2021-11-15 ENCOUNTER — PATIENT ROUNDING (BHMG ONLY) (OUTPATIENT)
Dept: INTERNAL MEDICINE | Facility: CLINIC | Age: 46
End: 2021-11-15

## 2021-11-15 NOTE — PROGRESS NOTES
November 15, 2021    Hello, may I speak with Conchita Page?    My name is Sophia      I am  with [facility]  [address] [place] [id number] [phone].    Before we get started may I verify your date of birth? 1975    Left message for patient to call back if there were any concerns with their visit.

## 2021-11-17 ENCOUNTER — TELEPHONE (OUTPATIENT)
Dept: CASE MANAGEMENT | Facility: OTHER | Age: 46
End: 2021-11-17

## 2021-11-17 DIAGNOSIS — F41.9 ANXIETY: Primary | ICD-10-CM

## 2021-11-17 DIAGNOSIS — F44.9 DISSOCIATION: ICD-10-CM

## 2021-11-17 NOTE — TELEPHONE ENCOUNTER
Order placed.  Please let the patient know and they should call to schedule her.  Please keep follow-up as scheduled.

## 2021-11-17 NOTE — TELEPHONE ENCOUNTER
Pt called and stated she is taking her new Trintellix, but would also like to be referred to psych, specifically Emilia MERCADO that comes to our office at [facility]. Referral attached.    Pt denies any SI or thoughts/plans of harming herself. Does state she does not feel like herself and is disconnected.

## 2021-11-30 ENCOUNTER — APPOINTMENT (OUTPATIENT)
Dept: MRI IMAGING | Facility: HOSPITAL | Age: 46
End: 2021-11-30
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
## 2021-12-06 ENCOUNTER — TELEMEDICINE (OUTPATIENT)
Dept: BEHAVIORAL HEALTH | Facility: CLINIC | Age: 46
End: 2021-12-06

## 2021-12-06 DIAGNOSIS — G47.00 INSOMNIA, UNSPECIFIED TYPE: ICD-10-CM

## 2021-12-06 DIAGNOSIS — F33.1 MODERATE EPISODE OF RECURRENT MAJOR DEPRESSIVE DISORDER (HCC): ICD-10-CM

## 2021-12-06 DIAGNOSIS — F41.1 GENERALIZED ANXIETY DISORDER: Primary | ICD-10-CM

## 2021-12-06 PROBLEM — T14.8XXA FRACTURE: Status: ACTIVE | Noted: 2021-12-06

## 2021-12-06 PROBLEM — R11.11 VOMITING WITHOUT NAUSEA: Status: ACTIVE | Noted: 2021-12-06

## 2021-12-06 PROBLEM — M47.816 LUMBAR SPONDYLOSIS: Status: ACTIVE | Noted: 2018-10-30

## 2021-12-06 PROBLEM — M51.36 DEGENERATION OF LUMBAR INTERVERTEBRAL DISC: Status: ACTIVE | Noted: 2018-10-30

## 2021-12-06 PROBLEM — M79.671 HEEL PAIN, BILATERAL: Status: ACTIVE | Noted: 2021-12-06

## 2021-12-06 PROBLEM — M51.369 DEGENERATION OF LUMBAR INTERVERTEBRAL DISC: Status: ACTIVE | Noted: 2018-10-30

## 2021-12-06 PROBLEM — R13.10 DYSPHAGIA: Status: ACTIVE | Noted: 2021-09-24

## 2021-12-06 PROBLEM — R11.2 NAUSEA & VOMITING: Status: ACTIVE | Noted: 2021-09-24

## 2021-12-06 PROBLEM — R12 HEARTBURN: Status: ACTIVE | Noted: 2021-09-24

## 2021-12-06 PROBLEM — R20.0 NUMBNESS IN FEET: Status: ACTIVE | Noted: 2021-12-06

## 2021-12-06 PROBLEM — M79.672 HEEL PAIN, BILATERAL: Status: ACTIVE | Noted: 2021-12-06

## 2021-12-06 PROCEDURE — 90792 PSYCH DIAG EVAL W/MED SRVCS: CPT | Performed by: PHYSICIAN ASSISTANT

## 2021-12-06 RX ORDER — DULOXETIN HYDROCHLORIDE 20 MG/1
20 CAPSULE, DELAYED RELEASE ORAL EVERY MORNING
Qty: 30 CAPSULE | Refills: 1 | Status: SHIPPED | OUTPATIENT
Start: 2021-12-06 | End: 2022-01-10

## 2021-12-06 RX ORDER — TRAZODONE HYDROCHLORIDE 50 MG/1
TABLET ORAL
Qty: 30 TABLET | Refills: 1 | Status: SHIPPED | OUTPATIENT
Start: 2021-12-06 | End: 2022-01-10 | Stop reason: SDUPTHER

## 2021-12-13 ENCOUNTER — OFFICE VISIT (OUTPATIENT)
Dept: INTERNAL MEDICINE | Facility: CLINIC | Age: 46
End: 2021-12-13

## 2021-12-13 VITALS
OXYGEN SATURATION: 100 % | HEART RATE: 74 BPM | RESPIRATION RATE: 18 BRPM | TEMPERATURE: 97.5 F | BODY MASS INDEX: 22.5 KG/M2 | DIASTOLIC BLOOD PRESSURE: 68 MMHG | WEIGHT: 131.8 LBS | SYSTOLIC BLOOD PRESSURE: 112 MMHG | HEIGHT: 64 IN

## 2021-12-13 DIAGNOSIS — F41.9 ANXIETY: Primary | Chronic | ICD-10-CM

## 2021-12-13 DIAGNOSIS — R51.9 NONINTRACTABLE EPISODIC HEADACHE, UNSPECIFIED HEADACHE TYPE: Chronic | ICD-10-CM

## 2021-12-13 DIAGNOSIS — M54.41 CHRONIC RIGHT-SIDED LOW BACK PAIN WITH RIGHT-SIDED SCIATICA: ICD-10-CM

## 2021-12-13 DIAGNOSIS — G89.29 CHRONIC RIGHT-SIDED LOW BACK PAIN WITH RIGHT-SIDED SCIATICA: ICD-10-CM

## 2021-12-13 DIAGNOSIS — M47.816 LUMBAR SPONDYLOSIS: ICD-10-CM

## 2021-12-13 PROCEDURE — 99214 OFFICE O/P EST MOD 30 MIN: CPT | Performed by: NURSE PRACTITIONER

## 2021-12-13 RX ORDER — TIZANIDINE 2 MG/1
2 TABLET ORAL EVERY 8 HOURS PRN
Qty: 90 TABLET | Refills: 0 | Status: SHIPPED | OUTPATIENT
Start: 2021-12-13 | End: 2022-07-13

## 2021-12-13 NOTE — ASSESSMENT & PLAN NOTE
She will continue to work with Emilia Avery.  She has stopped the Cymbalta at this time.  Discussed possibility of Strattera which she will discuss at the next visit if she feels like she would like to try something else at that time.  She currently reports doing fairly well.  Denies SI.  Discussed recommendation for counseling

## 2021-12-13 NOTE — ASSESSMENT & PLAN NOTE
"She will hold off on getting the MRI at this time.  She will call with new or worsening symptoms or go to the ER with\" worst headache of life\"  "

## 2021-12-13 NOTE — ASSESSMENT & PLAN NOTE
Discussed differential diagnosis for low back pain.  Discussed use of NSAIDs which she unfortunately cannot tolerate due to GI issues.  Also discussed muscle relaxer as needed for muscle spasm.  Recommend ice to low back for 20 minutes 4-6 times daily.  Patient advised to initially rest and slowly increase activity level.  Avoid bending, twisting, or heavy lifting.  Monitor for changes in symptoms such as numbness, tingling or weakness in legs, changes in bowel or bladder habits or worsening back pain.  Proper ergonomics discussed.  Consider referral to physical therapy.  Patient will call if symptoms worsen or go to the emergency department with lower extremity weakness or loss of bowel or bladder function.

## 2021-12-13 NOTE — PROGRESS NOTES
"Chief Complaint  Follow-up (Trintellix )    Subjective          Conchita Page presents to [facility]  History of Present Illness  She did not tolerate the trintellix well. She saw Emilia Avery. Medication was changed to cymbalta. She had additional issues -including insomnia, dizziness. She has stopped al medications.    She reports headache have also improved. She did not get MRI for financial reasons. She feels like she is okay and that her symptoms are r/t anxiety/depression/difficulty focusing.    Right sided back pain with spasm  She denies new injury  History of spinal surgery with Dr. Adler  Objective   Vital Signs:   /68 (BP Location: Left arm, Patient Position: Sitting, Cuff Size: Adult)   Pulse 74   Temp 97.5 °F (36.4 °C)   Resp 18   Ht 162.6 cm (64.02\")   Wt 59.8 kg (131 lb 12.8 oz)   SpO2 100%   BMI 22.61 kg/m²     Physical Exam  Vitals and nursing note reviewed.   Constitutional:       General: She is not in acute distress.     Appearance: Normal appearance.   HENT:      Head: Normocephalic and atraumatic.      Right Ear: External ear normal.      Left Ear: External ear normal.      Nose: Nose normal.      Mouth/Throat:      Mouth: Mucous membranes are moist.   Eyes:      Conjunctiva/sclera: Conjunctivae normal.   Cardiovascular:      Rate and Rhythm: Normal rate and regular rhythm.      Pulses: Normal pulses.      Heart sounds: Normal heart sounds. No murmur heard.  No friction rub. No gallop.    Pulmonary:      Effort: Pulmonary effort is normal. No respiratory distress.      Breath sounds: No wheezing, rhonchi or rales.   Musculoskeletal:      Cervical back: Neck supple.      Lumbar back: Spasms and tenderness present. No swelling or deformity.   Skin:     General: Skin is warm and dry.   Neurological:      General: No focal deficit present.      Mental Status: She is alert and oriented to person, place, and time.   Psychiatric:        " " Mood and Affect: Mood normal.         Behavior: Behavior normal.        Result Review :          Procedures      Assessment and Plan    Diagnoses and all orders for this visit:    1. Anxiety (Primary)  Assessment & Plan:  She will continue to work with Emilia Avery.  She has stopped the Cymbalta at this time.  Discussed possibility of Strattera which she will discuss at the next visit if she feels like she would like to try something else at that time.  She currently reports doing fairly well.  Denies SI.  Discussed recommendation for counseling      2. Lumbar spondylosis    3. Chronic right-sided low back pain with right-sided sciatica  Assessment & Plan:  Discussed differential diagnosis for low back pain.  Discussed use of NSAIDs which she unfortunately cannot tolerate due to GI issues.  Also discussed muscle relaxer as needed for muscle spasm.  Recommend ice to low back for 20 minutes 4-6 times daily.  Patient advised to initially rest and slowly increase activity level.  Avoid bending, twisting, or heavy lifting.  Monitor for changes in symptoms such as numbness, tingling or weakness in legs, changes in bowel or bladder habits or worsening back pain.  Proper ergonomics discussed.  Consider referral to physical therapy.  Patient will call if symptoms worsen or go to the emergency department with lower extremity weakness or loss of bowel or bladder function.      4. Nonintractable episodic headache, unspecified headache type  Assessment & Plan:  She will hold off on getting the MRI at this time.  She will call with new or worsening symptoms or go to the ER with\" worst headache of life\"      Other orders  -     tiZANidine (ZANAFLEX) 2 MG tablet; Take 1 tablet by mouth Every 8 (Eight) Hours As Needed for Muscle Spasms.  Dispense: 90 tablet; Refill: 0            Follow Up   Return if symptoms worsen or fail to improve.  Patient was given instructions and counseling regarding her condition or for health " maintenance advice. Please see specific information pulled into the AVS if appropriate.

## 2021-12-27 ENCOUNTER — IMMUNIZATION (OUTPATIENT)
Dept: VACCINE CLINIC | Facility: HOSPITAL | Age: 46
End: 2021-12-27

## 2021-12-27 DIAGNOSIS — Z23 NEED FOR VACCINATION: Primary | ICD-10-CM

## 2021-12-27 PROCEDURE — 91306 HC SARSCOV2 VAC 50MCG/0.25ML IM: CPT | Performed by: INTERNAL MEDICINE

## 2021-12-27 PROCEDURE — 0064A HC ADM SARSCOV2 50MCG/0.25ML BOOSTER: CPT | Performed by: INTERNAL MEDICINE

## 2022-01-10 ENCOUNTER — TELEMEDICINE (OUTPATIENT)
Dept: BEHAVIORAL HEALTH | Facility: CLINIC | Age: 47
End: 2022-01-10

## 2022-01-10 DIAGNOSIS — R41.840 POOR CONCENTRATION: ICD-10-CM

## 2022-01-10 DIAGNOSIS — F41.1 GENERALIZED ANXIETY DISORDER: Primary | ICD-10-CM

## 2022-01-10 DIAGNOSIS — G47.00 INSOMNIA, UNSPECIFIED TYPE: ICD-10-CM

## 2022-01-10 DIAGNOSIS — F33.1 MODERATE EPISODE OF RECURRENT MAJOR DEPRESSIVE DISORDER: ICD-10-CM

## 2022-01-10 PROBLEM — F41.9 ANXIETY: Status: ACTIVE | Noted: 2021-11-12

## 2022-01-10 PROBLEM — K22.0 ACHALASIA OF DIGESTIVE TRACT: Status: ACTIVE | Noted: 2021-11-12

## 2022-01-10 PROBLEM — R51.9 NONINTRACTABLE EPISODIC HEADACHE: Status: ACTIVE | Noted: 2021-11-12

## 2022-01-10 PROBLEM — R13.13 PHARYNGEAL DYSPHAGIA: Status: ACTIVE | Noted: 2021-07-27

## 2022-01-10 PROCEDURE — 99213 OFFICE O/P EST LOW 20 MIN: CPT | Performed by: PHYSICIAN ASSISTANT

## 2022-01-10 RX ORDER — ESCITALOPRAM OXALATE 10 MG/1
5 TABLET ORAL DAILY
Qty: 15 TABLET | Refills: 1 | Status: SHIPPED | OUTPATIENT
Start: 2022-01-10 | End: 2022-02-07

## 2022-01-10 RX ORDER — TRAZODONE HYDROCHLORIDE 50 MG/1
TABLET ORAL
Qty: 30 TABLET | Refills: 1 | Status: SHIPPED | OUTPATIENT
Start: 2022-01-10 | End: 2022-02-07 | Stop reason: SDUPTHER
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
## 2022-02-07 ENCOUNTER — TELEMEDICINE (OUTPATIENT)
Dept: BEHAVIORAL HEALTH | Facility: CLINIC | Age: 47
End: 2022-02-07

## 2022-02-07 DIAGNOSIS — G47.00 INSOMNIA, UNSPECIFIED TYPE: ICD-10-CM

## 2022-02-07 DIAGNOSIS — R41.840 POOR CONCENTRATION: Primary | ICD-10-CM

## 2022-02-07 DIAGNOSIS — F41.1 GENERALIZED ANXIETY DISORDER: ICD-10-CM

## 2022-02-07 PROCEDURE — 99213 OFFICE O/P EST LOW 20 MIN: CPT | Performed by: PHYSICIAN ASSISTANT

## 2022-02-07 RX ORDER — BUPROPION HYDROCHLORIDE 75 MG/1
75 TABLET ORAL 2 TIMES DAILY
Qty: 60 TABLET | Refills: 1 | Status: SHIPPED | OUTPATIENT
Start: 2022-02-07 | End: 2022-06-09

## 2022-02-07 RX ORDER — TRAZODONE HYDROCHLORIDE 50 MG/1
TABLET ORAL
Qty: 30 TABLET | Refills: 1 | Status: SHIPPED | OUTPATIENT
Start: 2022-02-07 | End: 2022-08-03 | Stop reason: ALTCHOICE
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
## 2022-02-23 ENCOUNTER — TELEPHONE (OUTPATIENT)
Dept: OBSTETRICS AND GYNECOLOGY | Facility: CLINIC | Age: 47
End: 2022-02-23

## 2022-02-23 DIAGNOSIS — Z00.00 WELL WOMAN EXAM (NO GYNECOLOGICAL EXAM): Primary | ICD-10-CM

## 2022-03-14 RX ORDER — PANTOPRAZOLE SODIUM 40 MG/1
40 TABLET, DELAYED RELEASE ORAL 2 TIMES DAILY
Qty: 30 TABLET | Refills: 0 | Status: SHIPPED | OUTPATIENT
Start: 2022-03-14 | End: 2022-06-09

## 2022-03-28 ENCOUNTER — OFFICE VISIT (OUTPATIENT)
Dept: OBSTETRICS AND GYNECOLOGY | Facility: CLINIC | Age: 47
End: 2022-03-28

## 2022-03-28 VITALS
SYSTOLIC BLOOD PRESSURE: 121 MMHG | DIASTOLIC BLOOD PRESSURE: 83 MMHG | BODY MASS INDEX: 21.27 KG/M2 | WEIGHT: 124 LBS | HEART RATE: 76 BPM

## 2022-03-28 DIAGNOSIS — N95.1 PERIMENOPAUSE: ICD-10-CM

## 2022-03-28 DIAGNOSIS — N93.9 ABNORMAL UTERINE BLEEDING (AUB): Primary | ICD-10-CM

## 2022-03-28 DIAGNOSIS — N76.3 CHRONIC VULVITIS: ICD-10-CM

## 2022-03-28 LAB
DEPRECATED RDW RBC AUTO: 40 FL (ref 37–54)
ERYTHROCYTE [DISTWIDTH] IN BLOOD BY AUTOMATED COUNT: 12.3 % (ref 12.3–15.4)
ESTRADIOL SERPL HS-MCNC: 165 PG/ML
FSH SERPL-ACNC: 56.2 MIU/ML
HCT VFR BLD AUTO: 41.5 % (ref 34–46.6)
HGB BLD-MCNC: 13.6 G/DL (ref 12–15.9)
MCH RBC QN AUTO: 29.2 PG (ref 26.6–33)
MCHC RBC AUTO-ENTMCNC: 32.8 G/DL (ref 31.5–35.7)
MCV RBC AUTO: 89.2 FL (ref 79–97)
PLATELET # BLD AUTO: 324 10*3/MM3 (ref 140–450)
PMV BLD AUTO: 10.8 FL (ref 6–12)
RBC # BLD AUTO: 4.65 10*6/MM3 (ref 3.77–5.28)
T4 FREE SERPL-MCNC: 1.07 NG/DL (ref 0.93–1.7)
TSH SERPL DL<=0.05 MIU/L-ACNC: 1.54 UIU/ML (ref 0.27–4.2)
WBC NRBC COR # BLD: 7.2 10*3/MM3 (ref 3.4–10.8)

## 2022-03-28 PROCEDURE — 83001 ASSAY OF GONADOTROPIN (FSH): CPT | Performed by: OBSTETRICS & GYNECOLOGY

## 2022-03-28 PROCEDURE — 84439 ASSAY OF FREE THYROXINE: CPT | Performed by: OBSTETRICS & GYNECOLOGY

## 2022-03-28 PROCEDURE — 85027 COMPLETE CBC AUTOMATED: CPT | Performed by: OBSTETRICS & GYNECOLOGY

## 2022-03-28 PROCEDURE — 84443 ASSAY THYROID STIM HORMONE: CPT | Performed by: OBSTETRICS & GYNECOLOGY

## 2022-03-28 PROCEDURE — G0123 SCREEN CERV/VAG THIN LAYER: HCPCS | Performed by: OBSTETRICS & GYNECOLOGY

## 2022-03-28 PROCEDURE — 87624 HPV HI-RISK TYP POOLED RSLT: CPT | Performed by: OBSTETRICS & GYNECOLOGY

## 2022-03-28 PROCEDURE — 99214 OFFICE O/P EST MOD 30 MIN: CPT | Performed by: OBSTETRICS & GYNECOLOGY

## 2022-03-28 PROCEDURE — 82670 ASSAY OF TOTAL ESTRADIOL: CPT | Performed by: OBSTETRICS & GYNECOLOGY

## 2022-03-28 RX ORDER — CLOTRIMAZOLE AND BETAMETHASONE DIPROPIONATE 10; .64 MG/G; MG/G
1 CREAM TOPICAL 2 TIMES DAILY
Qty: 45 G | Refills: 2 | Status: SHIPPED | OUTPATIENT
Start: 2022-03-28 | End: 2022-04-18

## 2022-03-28 NOTE — PROGRESS NOTES
GYN Visit    CC: Abnormal uterine bleeding    HPI:   47 y.o. Contraception or HRT: Contraception:  Vasectomy   Menses:   q 30 days, lasts 14-21 days, changes products q 1-2 hrs on heaviest days.   Pain:  Moderate, not too bad    Pt has concerns she would like to discuss.          History: PMHx, Meds, Allergies, PSHx, Social Hx, and POBHx all reviewed and updated.  Physical Exam   PHYSICAL EXAM:  /83   Pulse 76   Wt 56.2 kg (124 lb)   BMI 21.27 kg/m²   General- NAD, alert and oriented, appropriate  Psych- Normal mood, good memory    Neck- No masses, no thyroid enlargement  CV- Regular rhythm, no murnurs  Resp- CTA to bases, no wheezes  Abdomen- Soft, non distended, non tender, no masses    External genitalia- Normal female, no lesions  Urethra/meatus- Normal, no masses, non tender, no prolapse  Bladder- Normal, no masses, non tender, no prolapse  Vagina- Normal, no atrophy, no lesions, no discharge, no prolapse  Cvx- Normal, no lesions, no discharge, No cervical motion tenderness  Uterus- Normal size, shape & consistency.  Non tender, mobile, & no prolapse, Retroverted  Adnexa- No mass, non tender  Anus/Rectum/Perineum- Not performed    Lymphatic- No palpable neck, axillary, or groin nodes  Ext- No edema, no cyanosis    Skin- No lesions, no rashes, no acanthosis nigricans        ASSESSMENT AND PLAN:  Diagnoses and all orders for this visit:    1. Abnormal uterine bleeding (AUB) (Primary)  Assessment & Plan:  States her menses are getting very heavy  Menses are now q month but can last 2-3 weeks; start very heavy then slow to normal amount of bleeding  On heavy days she changes double protection q 1 hour  C/o moderate dysmenorrhea  Symptoms for the last 2-3 months    Orders:  -     US Pelvis Transvaginal Non OB; Future  -     CBC (No Diff)  -     TSH  -     T4, Free  -     Follicle Stimulating Hormone  -     Estradiol  -     IgP, Aptima HPV    2. Perimenopause  Assessment & Plan:  C/o hot flashes and  night sweats  Doesn't feel like herself  Decreased sex drive and significant mood swings  Also has concerns that labia minora are dry and irritated    Orders:  -     Follicle Stimulating Hormone  -     Estradiol    3. Chronic vulvitis  Comments:  Chronic inflammation noticed around bilateral labia minora there is no loss of architecture there is mild erythema without any skin thickening and no visible ex  Orders:  -     clotrimazole-betamethasone (Lotrisone) 1-0.05 % cream; Apply 1 application topically to the appropriate area as directed 2 (Two) Times a Day for 21 days.  Dispense: 45 g; Refill: 2        Counseling: OCP/hormone use risk THROMBOEMBOLIC RISK reviewed.     HRT R/B/A/SE/E all options including non-FDA approved options discussed w pt.         HRT- I recommend the lowest dose possible, for the shortest period of time.  Risks HRT NLT breast CA (progestin), CVA, MI, MIKEL.          Follow Up:  Return for post ultrasound.          Giovanna Polanco MD  03/28/2022

## 2022-03-28 NOTE — ASSESSMENT & PLAN NOTE
C/o hot flashes and night sweats  Doesn't feel like herself  Decreased sex drive and significant mood swings  Also has concerns that labia minora are dry and irritated

## 2022-03-28 NOTE — ASSESSMENT & PLAN NOTE
States her menses are getting very heavy  Menses are now q month but can last 2-3 weeks; start very heavy then slow to normal amount of bleeding  On heavy days she changes double protection q 1 hour  C/o moderate dysmenorrhea  Symptoms for the last 2-3 months

## 2022-04-04 LAB
CYTOLOGIST CVX/VAG CYTO: NORMAL
CYTOLOGY CVX/VAG DOC CYTO: NORMAL
CYTOLOGY CVX/VAG DOC THIN PREP: NORMAL
DX ICD CODE: NORMAL
HIV 1 & 2 AB SER-IMP: NORMAL
HPV I/H RISK 4 DNA CVX QL PROBE+SIG AMP: NEGATIVE
Lab: NORMAL
OTHER STN SPEC: NORMAL
STAT OF ADQ CVX/VAG CYTO-IMP: NORMAL

## 2022-04-06 ENCOUNTER — HOSPITAL ENCOUNTER (OUTPATIENT)
Dept: MAMMOGRAPHY | Facility: HOSPITAL | Age: 47
Discharge: HOME | End: 2022-04-06
Admitting: OBSTETRICS & GYNECOLOGY

## 2022-04-06 DIAGNOSIS — Z00.00 WELL WOMAN EXAM (NO GYNECOLOGICAL EXAM): ICD-10-CM

## 2022-04-06 PROCEDURE — 77067 SCR MAMMO BI INCL CAD: CPT

## 2022-04-06 PROCEDURE — 77063 BREAST TOMOSYNTHESIS BI: CPT

## 2022-04-18 ENCOUNTER — TELEPHONE (OUTPATIENT)
Dept: PSYCHIATRY | Facility: CLINIC | Age: 47
End: 2022-04-18

## 2022-04-18 NOTE — TELEPHONE ENCOUNTER
LVM following up on referral order, several attempts made with no success, sending encounter to provider and closing out referral.

## 2022-04-25 ENCOUNTER — TELEPHONE (OUTPATIENT)
Dept: OBSTETRICS AND GYNECOLOGY | Facility: CLINIC | Age: 47
End: 2022-04-25

## 2022-05-03 ENCOUNTER — TELEPHONE (OUTPATIENT)
Dept: OBSTETRICS AND GYNECOLOGY | Facility: CLINIC | Age: 47
End: 2022-05-03

## 2022-05-03 NOTE — TELEPHONE ENCOUNTER
Spoke with patient and she is scheduled for pelvic u/s, but will be out that week,  Patient will call to rescheduled that and then we will reschedule appt with Dr. Polanco.

## 2022-05-04 ENCOUNTER — TRANSCRIBE ORDERS (OUTPATIENT)
Dept: CARDIOLOGY | Facility: HOSPITAL | Age: 47
End: 2022-05-04

## 2022-05-04 ENCOUNTER — HOSPITAL ENCOUNTER (OUTPATIENT)
Dept: CARDIOLOGY | Facility: HOSPITAL | Age: 47
Discharge: HOME | End: 2022-05-04
Admitting: NURSE PRACTITIONER

## 2022-05-04 DIAGNOSIS — F90.2 ATTENTION DEFICIT HYPERACTIVITY DISORDER, COMBINED TYPE: ICD-10-CM

## 2022-05-04 DIAGNOSIS — F90.2 ATTENTION DEFICIT HYPERACTIVITY DISORDER, COMBINED TYPE: Primary | ICD-10-CM

## 2022-05-04 LAB — QT INTERVAL: 411 MS

## 2022-05-04 PROCEDURE — 93010 ELECTROCARDIOGRAM REPORT: CPT | Performed by: INTERNAL MEDICINE

## 2022-05-04 PROCEDURE — 93005 ELECTROCARDIOGRAM TRACING: CPT | Performed by: NURSE PRACTITIONER

## 2022-05-04 RX ORDER — PANTOPRAZOLE SODIUM 40 MG/1
40 TABLET, DELAYED RELEASE ORAL 2 TIMES DAILY
Qty: 30 TABLET | Refills: 0 | OUTPATIENT
Start: 2022-05-04

## 2022-05-27 ENCOUNTER — HOSPITAL ENCOUNTER (OUTPATIENT)
Dept: ULTRASOUND IMAGING | Facility: HOSPITAL | Age: 47
Discharge: HOME | End: 2022-05-27
Admitting: OBSTETRICS & GYNECOLOGY

## 2022-05-27 DIAGNOSIS — N93.9 ABNORMAL UTERINE BLEEDING (AUB): ICD-10-CM

## 2022-05-27 PROCEDURE — 76830 TRANSVAGINAL US NON-OB: CPT

## 2022-05-27 PROCEDURE — 76856 US EXAM PELVIC COMPLETE: CPT

## 2022-06-02 ENCOUNTER — APPOINTMENT (OUTPATIENT)
Dept: ULTRASOUND IMAGING | Facility: HOSPITAL | Age: 47
End: 2022-06-02

## 2022-06-03 ENCOUNTER — TELEPHONE (OUTPATIENT)
Dept: OBSTETRICS AND GYNECOLOGY | Facility: CLINIC | Age: 47
End: 2022-06-03

## 2022-06-03 NOTE — TELEPHONE ENCOUNTER
Pt called stating she is on her way home from an out of state vacation and she has been bleeding super heavy all week. She states within 35 minutes her tampon was completely full. She states yesterday she felt shaky and today she feels somewhat ''out of breath''. She has had an ultrasound done and has a follow up in office on Monday. I advised that with her bleeding that much she needs to be evaluated in the ER. Pt V/U.

## 2022-06-06 ENCOUNTER — OFFICE VISIT (OUTPATIENT)
Dept: OBSTETRICS AND GYNECOLOGY | Facility: CLINIC | Age: 47
End: 2022-06-06

## 2022-06-06 ENCOUNTER — PREP FOR SURGERY (OUTPATIENT)
Dept: OTHER | Facility: HOSPITAL | Age: 47
End: 2022-06-06

## 2022-06-06 VITALS
BODY MASS INDEX: 20.07 KG/M2 | DIASTOLIC BLOOD PRESSURE: 75 MMHG | HEART RATE: 91 BPM | WEIGHT: 117 LBS | SYSTOLIC BLOOD PRESSURE: 115 MMHG

## 2022-06-06 DIAGNOSIS — N95.1 PERIMENOPAUSE: ICD-10-CM

## 2022-06-06 DIAGNOSIS — D25.1 INTRAMURAL LEIOMYOMA OF UTERUS: ICD-10-CM

## 2022-06-06 DIAGNOSIS — N93.9 ABNORMAL UTERINE BLEEDING (AUB): Primary | ICD-10-CM

## 2022-06-06 PROCEDURE — 58100 BIOPSY OF UTERUS LINING: CPT | Performed by: OBSTETRICS & GYNECOLOGY

## 2022-06-06 PROCEDURE — 88305 TISSUE EXAM BY PATHOLOGIST: CPT | Performed by: OBSTETRICS & GYNECOLOGY

## 2022-06-06 RX ORDER — ACETAMINOPHEN 500 MG
1000 TABLET ORAL ONCE
Status: CANCELLED | OUTPATIENT
Start: 2022-06-06 | End: 2022-06-06

## 2022-06-06 RX ORDER — SODIUM CHLORIDE, SODIUM LACTATE, POTASSIUM CHLORIDE, CALCIUM CHLORIDE 600; 310; 30; 20 MG/100ML; MG/100ML; MG/100ML; MG/100ML
125 INJECTION, SOLUTION INTRAVENOUS CONTINUOUS
Status: CANCELLED | OUTPATIENT
Start: 2022-06-06

## 2022-06-06 RX ORDER — CEFAZOLIN SODIUM 2 G/100ML
2 INJECTION, SOLUTION INTRAVENOUS ONCE
Status: CANCELLED | OUTPATIENT
Start: 2022-06-06 | End: 2022-06-06

## 2022-06-06 RX ORDER — SODIUM CHLORIDE 0.9 % (FLUSH) 0.9 %
10 SYRINGE (ML) INJECTION AS NEEDED
Status: CANCELLED | OUTPATIENT
Start: 2022-06-06

## 2022-06-06 RX ORDER — SODIUM CHLORIDE 0.9 % (FLUSH) 0.9 %
3 SYRINGE (ML) INJECTION EVERY 12 HOURS SCHEDULED
Status: CANCELLED | OUTPATIENT
Start: 2022-06-06

## 2022-06-06 NOTE — PROGRESS NOTES
CC: Presents for Endometrial biopsy  Procedure was explained in detail.  She understands the potential risks include, but are not limited to, failure (pregnancy), pain, bleeding, uterine perforation, and infection.  Her questions have been answered.      Subjective:  Patient with persistent abnormal uterine bleeding.  She states abnormal bleeding has increased and amount since her last appointment.  Patient is interested in definitive therapy as she is previously failed medical management    Objective:  /75   Pulse 91   Wt 53.1 kg (117 lb)   BMI 20.07 kg/m²   General- NAD, alert and oriented, appropriate  Psych- Normal mood, good memory  Abdomen- Soft, non distended, non tender, no masses  External genitalia- Normal, no lesions  Vagina- Normal, no discharge  Uterus- IRREGULAR CONTOUR  Cvx- Normal without lesion or discharge.     Hibiclens x3.  Tenaculum placed Yes.   Uterus sounded to 9cm.    Patient tolerated well.      Assessment and Plan:  Diagnoses and all orders for this visit:    1. Abnormal uterine bleeding (AUB) (Primary)  Assessment & Plan:  Patient states bleeding has increased and heaviness since her last appointment in March 2022  She is now saturating double protection and having accidents with clothing  Transvaginal ultrasound revealed a fibroid with a 14 mm lining  Endometrial biopsy was performed today  Patient was counseled at length regarding hysteroscopy D&C with endometrial ablation.  All questions were answered and informed consent was obtained        2. Perimenopause  Assessment & Plan:  Patient has an elevated FSH of 56 with normal estradiol levels      3. Intramural leiomyoma of uterus        Counseling:  She was counseled on EMB.  PRECAUTIONS-- If she has any fevers >101.5F, and abdominal/pelvic pain, or bleeding > 1pad/2hours, she needs to call our office or on call/ER (after hours/weekend).Pt was counseled at length regarding the risks and benefits of surgery.  The risks  include but are not limited to the risk of anesthesia, the risk of bleeding, the risk of infection, the risk of injury to the bowel, bladder, ureters and any surrounding structures.  Risks also include possibility of needing future surgery to correct an issue as a result of the first surgery.  All questions were answered and informed consent was obtained.  Follow Up:  No follow-ups on file.      Giovanna Polanco MD  06/06/2022

## 2022-06-06 NOTE — ASSESSMENT & PLAN NOTE
Patient states bleeding has increased and heaviness since her last appointment in March 2022  She is now saturating double protection and having accidents with clothing  Transvaginal ultrasound revealed a fibroid with a 14 mm lining  Endometrial biopsy was performed today  Patient was counseled at length regarding hysteroscopy D&C with endometrial ablation.  All questions were answered and informed consent was obtained

## 2022-06-09 ENCOUNTER — OFFICE VISIT (OUTPATIENT)
Dept: INTERNAL MEDICINE | Facility: CLINIC | Age: 47
End: 2022-06-09

## 2022-06-09 VITALS
BODY MASS INDEX: 20.42 KG/M2 | SYSTOLIC BLOOD PRESSURE: 102 MMHG | OXYGEN SATURATION: 99 % | TEMPERATURE: 97.7 F | WEIGHT: 119.6 LBS | RESPIRATION RATE: 18 BRPM | HEIGHT: 64 IN | DIASTOLIC BLOOD PRESSURE: 70 MMHG | HEART RATE: 65 BPM

## 2022-06-09 DIAGNOSIS — H93.11 TINNITUS OF RIGHT EAR: Primary | ICD-10-CM

## 2022-06-09 DIAGNOSIS — H93.8X1 PRESSURE SENSATION IN RIGHT EAR: ICD-10-CM

## 2022-06-09 PROCEDURE — 99213 OFFICE O/P EST LOW 20 MIN: CPT | Performed by: INTERNAL MEDICINE

## 2022-06-09 NOTE — PRE-PROCEDURE INSTRUCTIONS
PATIENT INSTRUCTED TO BE:    - NPO AFTER MIDNIGHT EXCEPT CAN HAVE CLEAR LIQUIDS 2 HOURS PRIOR TO SURGERY ARRIVAL TIME     - TO HOLD ALL VITAMINS, SUPPLEMENTS, NSAIDS FOR ONE WEEK PRIOR TO THEIR SURGICAL PROCEDURE    - INSTRUCTED PT TO USE SURGICAL SOAP 1 TIME THE NIGHT PRIOR TO SURGERY OR THE AM OF SURGERY.   USE SOAP FROM NECK TO TOES AVOID THEIR FACE, HAIR, AND PRIVATE PARTS. INSTRUCTED NO LOTIONS, JEWELRY, PIERCINGS, OR DEODORANT DAY OF SURGERY        - INSTRUCTED TO TAKE THE FOLLOWING MEDICATIONS THE DAY OF SURGERY: None      PATIENT VERBALIZED UNDERSTANDING

## 2022-06-14 PROCEDURE — S0260 H&P FOR SURGERY: HCPCS | Performed by: OBSTETRICS & GYNECOLOGY

## 2022-06-14 NOTE — H&P
[facility]   PREOPERATIVE HISTORY AND PHYSICAL    Patient Name:Conchita Page  : 1975  MRN: [id number]  Primary Care Physician: Jenny Contreras APRN  Date of admission: (Not on file)    Subjective   Subjective     Chief Complaint: preoperative evaluation for abnormal uterine bleeding who has failed medical management    History of Present Illness  Conchita Page is a 47 y.o. female -1-0-3 who presents for preoperative evaluation. She is scheduled for HYSTEROSCOPY WITH ENDOMETRIAL ABLATION (N/A)  Patient has failed medical management  Review of Systems     Personal History     Past Medical History:   Diagnosis Date   • Anxiety    • Dysphagia    • Fracture     ankel   • Numbness in feet    • Urine frequency    • Vomiting        Past Surgical History:   Procedure Laterality Date   • APPENDECTOMY     • BACK SURGERY         •  SECTION     • ENDOSCOPY N/A 08/10/2021    Procedure: ESOPHAGOGASTRODUODENOSCOPY;  Surgeon: Xiomara Frederick MD;  Location: [facility] ENDOSCOPY;  Service: Gastroenterology;  Laterality: N/A;  ESOPH DILATATION BALLOON DIL TO  18MM       Family History: Her family history includes Alcohol abuse in her father, paternal aunt, paternal grandfather, and paternal uncle; Lung cancer in her father.     Social History: She  reports that she has never smoked. She has never used smokeless tobacco. She reports current alcohol use. She reports that she does not use drugs.    Home Medications:  amphetamine-dextroamphetamine, tiZANidine, and traZODone    Allergies:  She is allergic to adhesive tape, iodinated diagnostic agents, shellfish-derived products, and wound dressing adhesive.    Objective    Objective     Vitals:         Physical Exam  Constitutional:       Appearance: Normal appearance.   Cardiovascular:      Rate and Rhythm: Normal rate and regular rhythm.      Heart sounds: Normal heart sounds.   Pulmonary:      Effort: Pulmonary effort is normal.      Breath sounds:  Normal breath sounds.   Abdominal:      General: Abdomen is flat. Bowel sounds are normal.      Palpations: Abdomen is soft.   Neurological:      Mental Status: She is alert.     Endometrial biopsy is benign    Assessment & Plan   Assessment / Plan     Brief Patient Summary:  Conchita Page is a 47 y.o. female who presents for preoperative evaluation.    Pre-Op Diagnosis Codes:     * Abnormal uterine bleeding (AUB) [N93.9]    Active Hospital Problems:  Active Hospital Problems    Diagnosis    • **Abnormal uterine bleeding (AUB)      Plan:   Procedure(s):  HYSTEROSCOPY WITH ENDOMETRIAL ABLATION    The risks, benefits, and alternatives of the procedure including but not limited to Pt was counseled at length regarding the risks and benefits of surgery.  The risks include but are not limited to the risk of anesthesia, the risk of bleeding, the risk of infection, the risk of injury to the bowel, bladder, ureters and any surrounding structures.  Risks also include possibility of needing future surgery to correct an issue as a result of the first surgery.  All questions were answered and informed consent was obtained. and risks of the anesthesia were discussed in detail with the patient and questions were answered. No guarantees were made or implied. Informed consent was obtained.    Giovanna Polanco MD

## 2022-06-16 ENCOUNTER — ANESTHESIA EVENT (OUTPATIENT)
Dept: PERIOP | Facility: HOSPITAL | Age: 47
End: 2022-06-16

## 2022-06-16 ENCOUNTER — HOSPITAL ENCOUNTER (OUTPATIENT)
Facility: HOSPITAL | Age: 47
Setting detail: HOSPITAL OUTPATIENT SURGERY
Discharge: HOME | End: 2022-06-16
Attending: OBSTETRICS & GYNECOLOGY | Admitting: OBSTETRICS & GYNECOLOGY

## 2022-06-16 ENCOUNTER — ANESTHESIA (OUTPATIENT)
Dept: PERIOP | Facility: HOSPITAL | Age: 47
End: 2022-06-16

## 2022-06-16 VITALS
WEIGHT: 119.27 LBS | HEART RATE: 61 BPM | RESPIRATION RATE: 16 BRPM | TEMPERATURE: 97.9 F | BODY MASS INDEX: 20.36 KG/M2 | HEIGHT: 64 IN | DIASTOLIC BLOOD PRESSURE: 61 MMHG | SYSTOLIC BLOOD PRESSURE: 100 MMHG | OXYGEN SATURATION: 100 %

## 2022-06-16 DIAGNOSIS — N93.9 ABNORMAL UTERINE BLEEDING (AUB): ICD-10-CM

## 2022-06-16 LAB
BASOPHILS # BLD AUTO: 0.05 10*3/MM3 (ref 0–0.2)
BASOPHILS NFR BLD AUTO: 1 % (ref 0–1.5)
DEPRECATED RDW RBC AUTO: 41.2 FL (ref 37–54)
EOSINOPHIL # BLD AUTO: 0.18 10*3/MM3 (ref 0–0.4)
EOSINOPHIL NFR BLD AUTO: 3.5 % (ref 0.3–6.2)
ERYTHROCYTE [DISTWIDTH] IN BLOOD BY AUTOMATED COUNT: 12.6 % (ref 12.3–15.4)
HCG INTACT+B SERPL-ACNC: <0.5 MIU/ML
HCT VFR BLD AUTO: 33.5 % (ref 34–46.6)
HGB BLD-MCNC: 11.1 G/DL (ref 12–15.9)
IMM GRANULOCYTES # BLD AUTO: 0 10*3/MM3 (ref 0–0.05)
IMM GRANULOCYTES NFR BLD AUTO: 0 % (ref 0–0.5)
LYMPHOCYTES # BLD AUTO: 2.39 10*3/MM3 (ref 0.7–3.1)
LYMPHOCYTES NFR BLD AUTO: 46.1 % (ref 19.6–45.3)
MCH RBC QN AUTO: 29.7 PG (ref 26.6–33)
MCHC RBC AUTO-ENTMCNC: 33.1 G/DL (ref 31.5–35.7)
MCV RBC AUTO: 89.6 FL (ref 79–97)
MONOCYTES # BLD AUTO: 0.59 10*3/MM3 (ref 0.1–0.9)
MONOCYTES NFR BLD AUTO: 11.4 % (ref 5–12)
NEUTROPHILS NFR BLD AUTO: 1.98 10*3/MM3 (ref 1.7–7)
NEUTROPHILS NFR BLD AUTO: 38 % (ref 42.7–76)
NRBC BLD AUTO-RTO: 0 /100 WBC (ref 0–0.2)
PLATELET # BLD AUTO: 252 10*3/MM3 (ref 140–450)
PMV BLD AUTO: 9.7 FL (ref 6–12)
RBC # BLD AUTO: 3.74 10*6/MM3 (ref 3.77–5.28)
WBC NRBC COR # BLD: 5.19 10*3/MM3 (ref 3.4–10.8)

## 2022-06-16 PROCEDURE — 25010000002 DEXAMETHASONE PER 1 MG: Performed by: NURSE ANESTHETIST, CERTIFIED REGISTERED

## 2022-06-16 PROCEDURE — 88305 TISSUE EXAM BY PATHOLOGIST: CPT | Performed by: OBSTETRICS & GYNECOLOGY

## 2022-06-16 PROCEDURE — 85025 COMPLETE CBC W/AUTO DIFF WBC: CPT | Performed by: OBSTETRICS & GYNECOLOGY

## 2022-06-16 PROCEDURE — 25010000002 CEFAZOLIN IN DEXTROSE 2-4 GM/100ML-% SOLUTION: Performed by: OBSTETRICS & GYNECOLOGY

## 2022-06-16 PROCEDURE — 25010000002 PROPOFOL 10 MG/ML EMULSION: Performed by: NURSE ANESTHETIST, CERTIFIED REGISTERED

## 2022-06-16 PROCEDURE — 25010000002 ONDANSETRON PER 1 MG: Performed by: NURSE ANESTHETIST, CERTIFIED REGISTERED

## 2022-06-16 PROCEDURE — 84702 CHORIONIC GONADOTROPIN TEST: CPT | Performed by: OBSTETRICS & GYNECOLOGY

## 2022-06-16 PROCEDURE — 58558 HYSTEROSCOPY BIOPSY: CPT | Performed by: OBSTETRICS & GYNECOLOGY

## 2022-06-16 PROCEDURE — 25010000002 KETOROLAC TROMETHAMINE PER 15 MG: Performed by: NURSE ANESTHETIST, CERTIFIED REGISTERED

## 2022-06-16 PROCEDURE — 25010000002 MIDAZOLAM PER 1 MG: Performed by: ANESTHESIOLOGY

## 2022-06-16 RX ORDER — SODIUM CHLORIDE 0.9 % (FLUSH) 0.9 %
3 SYRINGE (ML) INJECTION EVERY 12 HOURS SCHEDULED
Status: DISCONTINUED | OUTPATIENT
Start: 2022-06-16 | End: 2022-06-16 | Stop reason: HOSPADM

## 2022-06-16 RX ORDER — KETOROLAC TROMETHAMINE 30 MG/ML
INJECTION, SOLUTION INTRAMUSCULAR; INTRAVENOUS AS NEEDED
Status: DISCONTINUED | OUTPATIENT
Start: 2022-06-16 | End: 2022-06-16 | Stop reason: SURG

## 2022-06-16 RX ORDER — ROCURONIUM BROMIDE 10 MG/ML
INJECTION, SOLUTION INTRAVENOUS AS NEEDED
Status: DISCONTINUED | OUTPATIENT
Start: 2022-06-16 | End: 2022-06-16 | Stop reason: SURG

## 2022-06-16 RX ORDER — MAGNESIUM HYDROXIDE 1200 MG/15ML
LIQUID ORAL AS NEEDED
Status: DISCONTINUED | OUTPATIENT
Start: 2022-06-16 | End: 2022-06-16 | Stop reason: HOSPADM

## 2022-06-16 RX ORDER — DEXAMETHASONE SODIUM PHOSPHATE 4 MG/ML
INJECTION, SOLUTION INTRA-ARTICULAR; INTRALESIONAL; INTRAMUSCULAR; INTRAVENOUS; SOFT TISSUE AS NEEDED
Status: DISCONTINUED | OUTPATIENT
Start: 2022-06-16 | End: 2022-06-16 | Stop reason: SURG

## 2022-06-16 RX ORDER — SODIUM CHLORIDE 0.9 % (FLUSH) 0.9 %
10 SYRINGE (ML) INJECTION AS NEEDED
Status: DISCONTINUED | OUTPATIENT
Start: 2022-06-16 | End: 2022-06-16 | Stop reason: HOSPADM

## 2022-06-16 RX ORDER — BUPIVACAINE HYDROCHLORIDE AND EPINEPHRINE 5; 5 MG/ML; UG/ML
INJECTION, SOLUTION EPIDURAL; INTRACAUDAL; PERINEURAL AS NEEDED
Status: DISCONTINUED | OUTPATIENT
Start: 2022-06-16 | End: 2022-06-16 | Stop reason: HOSPADM

## 2022-06-16 RX ORDER — PROMETHAZINE HYDROCHLORIDE 25 MG/1
25 SUPPOSITORY RECTAL ONCE AS NEEDED
Status: DISCONTINUED | OUTPATIENT
Start: 2022-06-16 | End: 2022-06-16 | Stop reason: HOSPADM

## 2022-06-16 RX ORDER — ONDANSETRON 2 MG/ML
INJECTION INTRAMUSCULAR; INTRAVENOUS AS NEEDED
Status: DISCONTINUED | OUTPATIENT
Start: 2022-06-16 | End: 2022-06-16 | Stop reason: SURG

## 2022-06-16 RX ORDER — SUCCINYLCHOLINE/SOD CL,ISO/PF 100 MG/5ML
SYRINGE (ML) INTRAVENOUS AS NEEDED
Status: DISCONTINUED | OUTPATIENT
Start: 2022-06-16 | End: 2022-06-16 | Stop reason: SURG

## 2022-06-16 RX ORDER — LIDOCAINE HYDROCHLORIDE 20 MG/ML
INJECTION, SOLUTION EPIDURAL; INFILTRATION; INTRACAUDAL; PERINEURAL AS NEEDED
Status: DISCONTINUED | OUTPATIENT
Start: 2022-06-16 | End: 2022-06-16 | Stop reason: SURG

## 2022-06-16 RX ORDER — MEPERIDINE HYDROCHLORIDE 25 MG/ML
12.5 INJECTION INTRAMUSCULAR; INTRAVENOUS; SUBCUTANEOUS
Status: DISCONTINUED | OUTPATIENT
Start: 2022-06-16 | End: 2022-06-16 | Stop reason: HOSPADM

## 2022-06-16 RX ORDER — PROPOFOL 10 MG/ML
VIAL (ML) INTRAVENOUS AS NEEDED
Status: DISCONTINUED | OUTPATIENT
Start: 2022-06-16 | End: 2022-06-16 | Stop reason: SURG

## 2022-06-16 RX ORDER — CEFAZOLIN SODIUM 2 G/100ML
2 INJECTION, SOLUTION INTRAVENOUS ONCE
Status: COMPLETED | OUTPATIENT
Start: 2022-06-16 | End: 2022-06-16

## 2022-06-16 RX ORDER — ONDANSETRON 2 MG/ML
4 INJECTION INTRAMUSCULAR; INTRAVENOUS ONCE AS NEEDED
Status: DISCONTINUED | OUTPATIENT
Start: 2022-06-16 | End: 2022-06-16 | Stop reason: HOSPADM

## 2022-06-16 RX ORDER — OXYCODONE HYDROCHLORIDE 5 MG/1
5 TABLET ORAL
Status: DISCONTINUED | OUTPATIENT
Start: 2022-06-16 | End: 2022-06-16 | Stop reason: HOSPADM

## 2022-06-16 RX ORDER — SODIUM CHLORIDE, SODIUM LACTATE, POTASSIUM CHLORIDE, CALCIUM CHLORIDE 600; 310; 30; 20 MG/100ML; MG/100ML; MG/100ML; MG/100ML
9 INJECTION, SOLUTION INTRAVENOUS CONTINUOUS PRN
Status: DISCONTINUED | OUTPATIENT
Start: 2022-06-16 | End: 2022-06-16 | Stop reason: HOSPADM

## 2022-06-16 RX ORDER — SODIUM CHLORIDE, SODIUM LACTATE, POTASSIUM CHLORIDE, CALCIUM CHLORIDE 600; 310; 30; 20 MG/100ML; MG/100ML; MG/100ML; MG/100ML
125 INJECTION, SOLUTION INTRAVENOUS CONTINUOUS
Status: DISCONTINUED | OUTPATIENT
Start: 2022-06-16 | End: 2022-06-16 | Stop reason: HOSPADM

## 2022-06-16 RX ORDER — PROMETHAZINE HYDROCHLORIDE 12.5 MG/1
25 TABLET ORAL ONCE AS NEEDED
Status: DISCONTINUED | OUTPATIENT
Start: 2022-06-16 | End: 2022-06-16 | Stop reason: HOSPADM

## 2022-06-16 RX ORDER — MIDAZOLAM HYDROCHLORIDE 1 MG/ML
2 INJECTION INTRAMUSCULAR; INTRAVENOUS ONCE
Status: COMPLETED | OUTPATIENT
Start: 2022-06-16 | End: 2022-06-16

## 2022-06-16 RX ORDER — ACETAMINOPHEN 500 MG
1000 TABLET ORAL ONCE
Status: COMPLETED | OUTPATIENT
Start: 2022-06-16 | End: 2022-06-16

## 2022-06-16 RX ORDER — IBUPROFEN 600 MG/1
600 TABLET ORAL EVERY 6 HOURS PRN
Qty: 40 TABLET | Refills: 0 | Status: SHIPPED | OUTPATIENT
Start: 2022-06-16 | End: 2022-08-03 | Stop reason: ALTCHOICE

## 2022-06-16 RX ADMIN — PROPOFOL 150 MG: 10 INJECTION, EMULSION INTRAVENOUS at 09:50

## 2022-06-16 RX ADMIN — DEXAMETHASONE SODIUM PHOSPHATE 4 MG: 4 INJECTION, SOLUTION INTRA-ARTICULAR; INTRALESIONAL; INTRAMUSCULAR; INTRAVENOUS; SOFT TISSUE at 09:58

## 2022-06-16 RX ADMIN — ACETAMINOPHEN 1000 MG: 500 TABLET ORAL at 08:47

## 2022-06-16 RX ADMIN — ROCURONIUM BROMIDE 5 MG: 10 INJECTION INTRAVENOUS at 09:48

## 2022-06-16 RX ADMIN — LIDOCAINE HYDROCHLORIDE 50 MG: 20 INJECTION, SOLUTION EPIDURAL; INFILTRATION; INTRACAUDAL; PERINEURAL at 09:48

## 2022-06-16 RX ADMIN — PROPOFOL 50 MG: 10 INJECTION, EMULSION INTRAVENOUS at 10:20

## 2022-06-16 RX ADMIN — Medication 100 MG: at 09:50

## 2022-06-16 RX ADMIN — ONDANSETRON 4 MG: 2 INJECTION INTRAMUSCULAR; INTRAVENOUS at 10:01

## 2022-06-16 RX ADMIN — CEFAZOLIN SODIUM 2 G: 2 INJECTION, SOLUTION INTRAVENOUS at 09:55

## 2022-06-16 RX ADMIN — MIDAZOLAM HYDROCHLORIDE 2 MG: 1 INJECTION, SOLUTION INTRAMUSCULAR; INTRAVENOUS at 09:25

## 2022-06-16 RX ADMIN — SODIUM CHLORIDE, POTASSIUM CHLORIDE, SODIUM LACTATE AND CALCIUM CHLORIDE 9 ML/HR: 600; 310; 30; 20 INJECTION, SOLUTION INTRAVENOUS at 08:46

## 2022-06-16 RX ADMIN — KETOROLAC TROMETHAMINE 30 MG: 30 INJECTION, SOLUTION INTRAMUSCULAR; INTRAVENOUS at 10:10

## 2022-06-16 NOTE — OP NOTE
DILATATION AND CURETTAGE HYSTEROSCOPY  Procedure Report    Patient Name:  Conchita Page  YOB: 1975    Date of Surgery:  6/16/2022         Pre-op Diagnosis:   Abnormal uterine bleeding (AUB) [N93.9]       Post-Op Diagnosis Codes:     * Abnormal uterine bleeding (AUB) [N93.9]    Procedure/CPT® Codes:      Procedure(s):  HYSTEROSCOPY dilation and curettage WITH ENDOMETRIAL ABLATION    Staff:  Surgeon(s):  Giovanna Polanco MD         Anesthesia: Choice    Estimated Blood Loss: minimal        Specimen:          Specimens     ID Source Type Tests Collected By Collected At Frozen?    A Endometrial Curettings Tissue · TISSUE PATHOLOGY EXAM   Giovanna Polanco MD 6/16/22 1001     Description: endometrial curettings              Findings: Uterus sounded to 10 cm with a cervical length of 5 cm and 9 cavity length of 5 cm.  Cavity width was 4 cm.  Power was 110 W and total time of ablation was 1 minute 26 seconds.  Endometrial cavity was grossly normal bilateral tubal ostia were visualized and there was no evidence of uterine perforation    Complications: None    Description of Procedure: Pt was taken to the operating room and placed under General anesthesia via LMA. She was placed in low dorsal lithotomy in yellow fin stirrups and prepped and draped in usual sterile fashion. I was called to the room following completion of draping. A surgical time out was performed. A speculum was placed in the vagina and the anterior lip of the cervix was grasped with a single tooth tenaculum. The uterus was gently sounded to a depth of 10 cm A paracervical block was performed with half percent Marcaine with epinephrine approximately 5 mL bilaterally.  The hysteroscope was gently introduced into the endometrial cavity with the above noted findings. The hysteroscope was withdrawn and multiple passes were made with a sharp curette.  The NovaSure device was placed into the endometrial cavity.  The cavity assessment passed  successfully.  The endometrial ablation was performed with the above-noted parameters.  The NovaSure device was withdrawn from the endometrial cavity.  The hysteroscope was gently reintroduced and a homogenous burn was noted throughout the endometrial cavity.  The single-tooth tenaculum was removed from the anterior lip of the cervix.  The speculum was withdrawn.  Excellent hemostasis was observed. All instruments were removed from the vagina. All lap, sponge, and needle counts were correct x 3. The pt tolerated the procedure well and went to the recovery room in stable condition.       Giovanna Polanco MD     Date: 6/16/2022  Time: 10:32 EDT

## 2022-06-16 NOTE — ANESTHESIA PREPROCEDURE EVALUATION
Anesthesia Evaluation     Patient summary reviewed and Nursing notes reviewed   no history of anesthetic complications:  NPO Solid Status: > 8 hours  NPO Liquid Status: > 2 hours           Airway   Mallampati: II  Dental    (+) implants    Pulmonary - negative pulmonary ROS   Cardiovascular - negative cardio ROS  Exercise tolerance: good (4-7 METS)        Neuro/Psych  (+) psychiatric history Anxiety,    GI/Hepatic/Renal/Endo - negative ROS     ROS Comment: achalasia    Musculoskeletal     Abdominal    Substance History - negative use     OB/GYN negative ob/gyn ROS         Other   arthritis,                      Anesthesia Plan    ASA 3     general   Rapid sequence  (Achalasia so rapid sequence recommended)    Anesthetic plan, risks, benefits, and alternatives have been provided, discussed and informed consent has been obtained with: patient.        CODE STATUS:

## 2022-06-16 NOTE — DISCHARGE INSTRUCTIONS
DISCHARGE INSTRUCTIONS  GYNECOLOGICAL  PROCEDURES      For your surgery you had:  General anesthesia (you may have a sore throat for the first 24 hours)  You may experience dizziness, drowsiness, or lightheadedness for several hours following surgery.  Do not stay alone today or tonight.  Limit your activity for 24 hours.  Resume your diet slowly.  Follow any special dietary instructions you may have been given by your doctor.  You should not drive or operate machinery, drink alcohol, or sign legally binding documents for 24 hours or while you are taking pain medication.    NOTIFY YOUR DOCTOR IF YOU EXPERIENCE ANY OF THE FOLLOWING:  Temperature greater than 101 degrees Fahrenheit  Shaking Chills  Redness or excessive drainage from incision  Nausea, vomiting and/or pain that is not controlled by prescribed medications  Increase in bleeding or bleeding that is excessive  Unable to urinate in 6 hours after surgery  If unable to reach your doctor, please go to the closest Emergency Room           [x] You may resume intercourse and the use of tampons as your physician has instructed you or seen by physician in office.  [x] Vaginal bleeding may be expected for several days with flow decreasing with time and never any heavier than a normal   period.  If you have an ablation, vaginal discharge is expected after bleeding stops.   If you have foul smelling discharge, notify your physician.      Last dose of pain medication was given at:  Tylenol (1000mg) last at 8:45am, may take tylenol next at 2:45pm if needed, do not exceed 4000mg in a 24 hour period.  Toradol last at 10am, may take ibuprofen next at 4pm if needed.

## 2022-06-16 NOTE — ANESTHESIA POSTPROCEDURE EVALUATION
Patient: Conchita Page    Procedure Summary     Date: 06/16/22 Room / Location: [facility] OR 04 / [facility] MAIN OR    Anesthesia Start: 0945 Anesthesia Stop: 1044    Procedure: HYSTEROSCOPY dilation and curettage WITH ENDOMETRIAL ABLATION (N/A Uterus) Diagnosis:       Abnormal uterine bleeding (AUB)      (Abnormal uterine bleeding (AUB) [N93.9])    Surgeons: Giovanna Polanco MD Provider: Demarcus Jean MD    Anesthesia Type: general ASA Status: 3          Anesthesia Type: general    Vitals  Vitals Value Taken Time   /67 06/16/22 1047   Temp     Pulse 80 06/16/22 1048   Resp     SpO2 100 % 06/16/22 1048   Vitals shown include unvalidated device data.        Post Anesthesia Care and Evaluation    Patient location during evaluation: bedside  Patient participation: complete - patient participated  Level of consciousness: awake and alert  Pain management: adequate    Airway patency: patent  Anesthetic complications: No anesthetic complications  PONV Status: none  Cardiovascular status: acceptable  Respiratory status: acceptable  Hydration status: acceptable    Comments: An Anesthesiologist personally participated in the most demanding procedures (including induction and emergence if applicable) in the anesthesia plan, monitored the course of anesthesia administration at frequent intervals and remained physically present and available for immediate diagnosis and treatment of emergencies.

## 2022-06-17 ENCOUNTER — TELEPHONE (OUTPATIENT)
Dept: OBSTETRICS AND GYNECOLOGY | Facility: CLINIC | Age: 47
End: 2022-06-17

## 2022-06-17 RX ORDER — NITROFURANTOIN 25; 75 MG/1; MG/1
100 CAPSULE ORAL 2 TIMES DAILY
Qty: 6 CAPSULE | Refills: 0 | Status: SHIPPED | OUTPATIENT
Start: 2022-06-17 | End: 2022-07-13

## 2022-06-17 RX ORDER — NITROFURANTOIN 25; 75 MG/1; MG/1
100 CAPSULE ORAL 2 TIMES DAILY
Qty: 6 CAPSULE | Refills: 0 | Status: SHIPPED | OUTPATIENT
Start: 2022-06-17 | End: 2022-06-17 | Stop reason: SDUPTHER

## 2022-06-17 RX ORDER — PHENAZOPYRIDINE HYDROCHLORIDE 200 MG/1
200 TABLET, FILM COATED ORAL 3 TIMES DAILY PRN
Qty: 6 TABLET | Refills: 0 | Status: SHIPPED | OUTPATIENT
Start: 2022-06-17 | End: 2022-06-17 | Stop reason: SDUPTHER

## 2022-06-17 RX ORDER — PHENAZOPYRIDINE HYDROCHLORIDE 200 MG/1
200 TABLET, FILM COATED ORAL 3 TIMES DAILY PRN
Qty: 6 TABLET | Refills: 0 | Status: SHIPPED | OUTPATIENT
Start: 2022-06-17 | End: 2022-07-13

## 2022-07-13 ENCOUNTER — OFFICE VISIT (OUTPATIENT)
Dept: OBSTETRICS AND GYNECOLOGY | Facility: CLINIC | Age: 47
End: 2022-07-13

## 2022-07-13 VITALS
HEART RATE: 79 BPM | WEIGHT: 117 LBS | BODY MASS INDEX: 19.97 KG/M2 | HEIGHT: 64 IN | DIASTOLIC BLOOD PRESSURE: 71 MMHG | SYSTOLIC BLOOD PRESSURE: 105 MMHG

## 2022-07-13 DIAGNOSIS — D25.1 INTRAMURAL LEIOMYOMA OF UTERUS: ICD-10-CM

## 2022-07-13 DIAGNOSIS — N93.9 ABNORMAL UTERINE BLEEDING (AUB): Primary | ICD-10-CM

## 2022-07-13 PROCEDURE — 99212 OFFICE O/P EST SF 10 MIN: CPT | Performed by: OBSTETRICS & GYNECOLOGY

## 2022-07-13 NOTE — ASSESSMENT & PLAN NOTE
Patient is 2 weeks status post hysteroscopy D&C with endometrial ablation  She is still having a small amount of discharge  Her pain has resolved  Pathology is negative

## 2022-07-13 NOTE — PROGRESS NOTES
"Post Op Office Visit  CC:    Chief Complaint   Patient presents with   • Post-op     3 Wks PO Hysteroscopy w/ Ablation           HPI: Small amount of discharge  Operative report, surgical findings and any pathology reviewed.    PHYSICAL EXAM:  /71   Pulse 79   Ht 162.6 cm (64\")   Wt 53.1 kg (117 lb)   Breastfeeding No   BMI 20.08 kg/m²   General- NAD, alert and oriented, appropriate  Psych- Normal mood, good memory      ASSESSMENT and PLAN:  Post-operative exam    Diagnoses and all orders for this visit:    1. Abnormal uterine bleeding (AUB) (Primary)  Assessment & Plan:  Patient is 2 weeks status post hysteroscopy D&C with endometrial ablation  She is still having a small amount of discharge  Her pain has resolved  Pathology is negative      2. Intramural leiomyoma of uterus          Any available photos and/or pathology were reviewed.  All questions answered.     Ok to resume normal activities  Ok to resume intercourse  Return to school/work without limitations            Follow Up:  Return for Annual physical.            Giovanna Polanco MD  07/13/2022    "

## 2022-08-03 ENCOUNTER — OFFICE VISIT (OUTPATIENT)
Dept: OTOLARYNGOLOGY | Facility: CLINIC | Age: 47
End: 2022-08-03

## 2022-08-03 ENCOUNTER — PROCEDURE VISIT (OUTPATIENT)
Dept: OTOLARYNGOLOGY | Facility: CLINIC | Age: 47
End: 2022-08-03

## 2022-08-03 VITALS — WEIGHT: 118 LBS | TEMPERATURE: 97.2 F | HEIGHT: 64 IN | BODY MASS INDEX: 20.14 KG/M2

## 2022-08-03 DIAGNOSIS — H73.891 OTHER SPECIFIED DISORDERS OF TYMPANIC MEMBRANE, RIGHT EAR: ICD-10-CM

## 2022-08-03 DIAGNOSIS — H69.81 DYSFUNCTION OF RIGHT EUSTACHIAN TUBE: ICD-10-CM

## 2022-08-03 DIAGNOSIS — H90.42 SENSORINEURAL HEARING LOSS (SNHL) OF LEFT EAR WITH UNRESTRICTED HEARING OF RIGHT EAR: ICD-10-CM

## 2022-08-03 DIAGNOSIS — H61.23 BILATERAL IMPACTED CERUMEN: Primary | ICD-10-CM

## 2022-08-03 DIAGNOSIS — H90.42 SENSORINEURAL HEARING LOSS, UNILATERAL, LEFT EAR, WITH UNRESTRICTED HEARING ON THE CONTRALATERAL SIDE: ICD-10-CM

## 2022-08-03 PROCEDURE — 99204 OFFICE O/P NEW MOD 45 MIN: CPT | Performed by: NURSE PRACTITIONER

## 2022-08-03 PROCEDURE — 92567 TYMPANOMETRY: CPT | Performed by: AUDIOLOGIST

## 2022-08-03 PROCEDURE — 92557 COMPREHENSIVE HEARING TEST: CPT | Performed by: AUDIOLOGIST

## 2022-08-03 PROCEDURE — 69210 REMOVE IMPACTED EAR WAX UNI: CPT | Performed by: NURSE PRACTITIONER
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
## 2022-09-29 PROBLEM — N93.0 POSTCOITAL BLEEDING: Status: ACTIVE | Noted: 2022-09-29

## 2022-11-16 ENCOUNTER — HOSPITAL ENCOUNTER (OUTPATIENT)
Dept: ULTRASOUND IMAGING | Facility: HOSPITAL | Age: 47
Discharge: HOME | End: 2022-11-16
Admitting: NURSE PRACTITIONER

## 2022-11-16 ENCOUNTER — OFFICE VISIT (OUTPATIENT)
Dept: INTERNAL MEDICINE | Facility: CLINIC | Age: 47
End: 2022-11-16

## 2022-11-16 VITALS
BODY MASS INDEX: 19.17 KG/M2 | DIASTOLIC BLOOD PRESSURE: 64 MMHG | OXYGEN SATURATION: 96 % | HEART RATE: 95 BPM | TEMPERATURE: 97.9 F | HEIGHT: 64 IN | SYSTOLIC BLOOD PRESSURE: 104 MMHG | WEIGHT: 112.25 LBS

## 2022-11-16 DIAGNOSIS — R10.11 RUQ ABDOMINAL PAIN: Primary | ICD-10-CM

## 2022-11-16 DIAGNOSIS — R10.11 RUQ ABDOMINAL PAIN: ICD-10-CM

## 2022-11-16 DIAGNOSIS — Z13.220 SCREENING FOR LIPID DISORDERS: ICD-10-CM

## 2022-11-16 LAB
ALBUMIN SERPL-MCNC: 4.5 G/DL (ref 3.5–5.2)
ALBUMIN/GLOB SERPL: 1.7 G/DL
ALP SERPL-CCNC: 61 U/L (ref 39–117)
ALT SERPL W P-5'-P-CCNC: 7 U/L (ref 1–33)
ANION GAP SERPL CALCULATED.3IONS-SCNC: 9.2 MMOL/L (ref 5–15)
AST SERPL-CCNC: 16 U/L (ref 1–32)
BASOPHILS # BLD AUTO: 0.05 10*3/MM3 (ref 0–0.2)
BASOPHILS NFR BLD AUTO: 0.6 % (ref 0–1.5)
BILIRUB SERPL-MCNC: 0.5 MG/DL (ref 0–1.2)
BUN SERPL-MCNC: 12 MG/DL (ref 6–20)
BUN/CREAT SERPL: 14.1 (ref 7–25)
CALCIUM SPEC-SCNC: 9.6 MG/DL (ref 8.6–10.5)
CHLORIDE SERPL-SCNC: 102 MMOL/L (ref 98–107)
CHOLEST SERPL-MCNC: 184 MG/DL (ref 0–200)
CO2 SERPL-SCNC: 25.8 MMOL/L (ref 22–29)
CREAT SERPL-MCNC: 0.85 MG/DL (ref 0.57–1)
DEPRECATED RDW RBC AUTO: 40 FL (ref 37–54)
EGFRCR SERPLBLD CKD-EPI 2021: 85.7 ML/MIN/1.73
EOSINOPHIL # BLD AUTO: 0.21 10*3/MM3 (ref 0–0.4)
EOSINOPHIL NFR BLD AUTO: 2.7 % (ref 0.3–6.2)
ERYTHROCYTE [DISTWIDTH] IN BLOOD BY AUTOMATED COUNT: 12.7 % (ref 12.3–15.4)
GLOBULIN UR ELPH-MCNC: 2.6 GM/DL
GLUCOSE SERPL-MCNC: 83 MG/DL (ref 65–99)
HCT VFR BLD AUTO: 41.2 % (ref 34–46.6)
HDLC SERPL-MCNC: 80 MG/DL (ref 40–60)
HGB BLD-MCNC: 13.5 G/DL (ref 12–15.9)
IMM GRANULOCYTES # BLD AUTO: 0.02 10*3/MM3 (ref 0–0.05)
IMM GRANULOCYTES NFR BLD AUTO: 0.3 % (ref 0–0.5)
LDLC SERPL CALC-MCNC: 85 MG/DL (ref 0–100)
LDLC/HDLC SERPL: 1.03 {RATIO}
LIPASE SERPL-CCNC: 34 U/L (ref 13–60)
LYMPHOCYTES # BLD AUTO: 3.09 10*3/MM3 (ref 0.7–3.1)
LYMPHOCYTES NFR BLD AUTO: 39.7 % (ref 19.6–45.3)
MCH RBC QN AUTO: 28.7 PG (ref 26.6–33)
MCHC RBC AUTO-ENTMCNC: 32.8 G/DL (ref 31.5–35.7)
MCV RBC AUTO: 87.5 FL (ref 79–97)
MONOCYTES # BLD AUTO: 0.82 10*3/MM3 (ref 0.1–0.9)
MONOCYTES NFR BLD AUTO: 10.5 % (ref 5–12)
NEUTROPHILS NFR BLD AUTO: 3.6 10*3/MM3 (ref 1.7–7)
NEUTROPHILS NFR BLD AUTO: 46.2 % (ref 42.7–76)
NRBC BLD AUTO-RTO: 0 /100 WBC (ref 0–0.2)
PLATELET # BLD AUTO: 279 10*3/MM3 (ref 140–450)
PMV BLD AUTO: 11.1 FL (ref 6–12)
POTASSIUM SERPL-SCNC: 4.3 MMOL/L (ref 3.5–5.2)
PROT SERPL-MCNC: 7.1 G/DL (ref 6–8.5)
RBC # BLD AUTO: 4.71 10*6/MM3 (ref 3.77–5.28)
SODIUM SERPL-SCNC: 137 MMOL/L (ref 136–145)
TRIGL SERPL-MCNC: 109 MG/DL (ref 0–150)
TSH SERPL DL<=0.05 MIU/L-ACNC: 2.06 UIU/ML (ref 0.27–4.2)
VLDLC SERPL-MCNC: 19 MG/DL (ref 5–40)
WBC NRBC COR # BLD: 7.79 10*3/MM3 (ref 3.4–10.8)

## 2022-11-16 PROCEDURE — 80050 GENERAL HEALTH PANEL: CPT | Performed by: NURSE PRACTITIONER

## 2022-11-16 PROCEDURE — 83690 ASSAY OF LIPASE: CPT | Performed by: NURSE PRACTITIONER

## 2022-11-16 PROCEDURE — 76705 ECHO EXAM OF ABDOMEN: CPT

## 2022-11-16 PROCEDURE — 99214 OFFICE O/P EST MOD 30 MIN: CPT | Performed by: NURSE PRACTITIONER

## 2022-11-16 PROCEDURE — 80061 LIPID PANEL: CPT | Performed by: NURSE PRACTITIONER

## 2022-11-16 NOTE — ASSESSMENT & PLAN NOTE
Concern for cholecystitis, will check routine labs today and schedule for RUQ ultrasound. Due to duration and severity of symptoms will proceed with HIDA scan even if ultrasound is normal. May need to consider abdominal CT in the future if workup is unremarkable. Patient should proceed to the ED with severe/persistent pain, nausea, vomiting, fever. Will determine further plan of care based on results of imaging.

## 2022-11-16 NOTE — PROGRESS NOTES
"Chief Complaint  Abdominal Pain (Episodes of right upper quadrant pain /Bowels are fine, nausea at points and bouts of acid reflux )    Subjective         Conchita Page presents to [facility]  Patient reports 2 episodes of significant right upper quadrant pain.  Most recent episode yesterday, pain lasted all day. Describes as a burning sensation 8/10 on the pain scale. 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 Constant pain, varies in severity. Was exacerbated by food. Associated nausea and mild GERD symptoms. Patient admits to history of GERD, has been off of medication for a while. Had EGD this year, diagnosed with achalasia. She was referred to a cardiothoracic surgeon in [place] but is postponing surgical intervention for now. Stools have been more loose but not watery. Denies mucous or hematochezia. Denies fever. Patient has not yet eaten today. History of appendectomy.        Objective     Vitals:    11/16/22 0853   BP: 104/64   BP Location: Left arm   Patient Position: Sitting   Cuff Size: Adult   Pulse: 95   Temp: 97.9 °F (36.6 °C)   TempSrc: Infrared   SpO2: 96%   Weight: 50.9 kg (112 lb 4 oz)   Height: 162.6 cm (64\")      Body mass index is 19.27 kg/m².    Wt Readings from Last 3 Encounters:   11/16/22 50.9 kg (112 lb 4 oz)   08/03/22 53.5 kg (118 lb)   07/13/22 53.1 kg (117 lb)     BP Readings from Last 3 Encounters:   11/16/22 104/64   07/13/22 105/71   06/16/22 100/61       BMI is within normal parameters. No other follow-up for BMI required.         Physical Exam  Constitutional:       Appearance: Normal appearance.   HENT:      Head: Normocephalic and atraumatic.      Nose: Nose normal.      Mouth/Throat:      Mouth: Mucous membranes are moist.      Pharynx: Oropharynx is clear.   Eyes:      Extraocular Movements: Extraocular movements intact.      Conjunctiva/sclera: " Conjunctivae normal.      Pupils: Pupils are equal, round, and reactive to light.   Cardiovascular:      Rate and Rhythm: Normal rate and regular rhythm.      Heart sounds: Normal heart sounds.   Pulmonary:      Effort: Pulmonary effort is normal.      Breath sounds: Normal breath sounds.   Abdominal:      Tenderness: There is abdominal tenderness. There is no guarding or rebound.      Comments: Mild RUQ tenderness on exam, negative Lockhart sign   Skin:     General: Skin is warm and dry.   Neurological:      General: No focal deficit present.      Mental Status: She is alert and oriented to person, place, and time.   Psychiatric:         Mood and Affect: Mood normal.         Behavior: Behavior normal.         Thought Content: Thought content normal.          Result Review :   The following data was reviewed by: LATASHA Reynolds on 11/16/2022:      Procedures    Assessment and Plan   Diagnoses and all orders for this visit:    1. RUQ abdominal pain (Primary)  Assessment & Plan:  Concern for cholecystitis, will check routine labs today and schedule for RUQ ultrasound. Due to duration and severity of symptoms will proceed with HIDA scan even if ultrasound is normal. May need to consider abdominal CT in the future if workup is unremarkable. Patient should proceed to the ED with severe/persistent pain, nausea, vomiting, fever. Will determine further plan of care based on results of imaging.     Orders:  -     US Gallbladder; Future  -     Comprehensive Metabolic Panel  -     CBC & Differential  -     TSH  -     Lipase    2. Screening for lipid disorders  Comments:  Lipid panel with labs.  Orders:  -     Lipid panel        Follow Up   Return if symptoms worsen or fail to improve.  Patient was given instructions and counseling regarding her condition or for health maintenance advice. Please see specific information pulled into the AVS if appropriate.

## 2022-11-17 ENCOUNTER — TELEPHONE (OUTPATIENT)
Dept: INTERNAL MEDICINE | Facility: CLINIC | Age: 47
End: 2022-11-17

## 2022-11-17 NOTE — TELEPHONE ENCOUNTER
Hub staff attempted to follow warm transfer process and was unsuccessful     Caller: Conchita Page    Relationship to patient: Self    Best call back number: [phone]    Patient is needing: PATIENT WOULD LIKE A CALL BACK TO DISCUSS MEDICATION WITH CLINICAL STAFF    PATIENT DID NOT GIVE THE NAME OF MEDICATION     PLEASE ADVISE

## 2022-11-18 RX ORDER — OMEPRAZOLE 20 MG/1
20 CAPSULE, DELAYED RELEASE ORAL DAILY
Qty: 90 CAPSULE | Refills: 0 | Status: SHIPPED | OUTPATIENT
Start: 2022-11-18

## 2022-11-18 RX ORDER — SUCRALFATE 1 G/1
1 TABLET ORAL 4 TIMES DAILY PRN
Qty: 120 TABLET | Refills: 0 | Status: SHIPPED | OUTPATIENT
Start: 2022-11-18 | End: 2022-12-18

## 2022-12-08 ENCOUNTER — APPOINTMENT (OUTPATIENT)
Dept: NUCLEAR MEDICINE | Facility: HOSPITAL | Age: 47
End: 2022-12-08

## 2023-04-03 NOTE — PROGRESS NOTES
Well Woman Visit    CC: Scheduled annual well gyn visit  Chief Complaint   Patient presents with   • Gynecologic Exam     Bleeding after intercourse        Myriad intake in the past?: N/A      Contraception:  Vasectomy     HPI:   48 y.o. s/p endometrial ablation.  Cycles have almost completely stopped      PCP: does manage PMHx and preventative labs  History: PMHx, Meds, Allergies, PSHx, Social Hx, and POBHx all reviewed and updated.    Pt has concerns she would like to discuss.    PHYSICAL EXAM:  /74   Pulse 73   Wt 49.2 kg (108 lb 6.4 oz)   Breastfeeding No   BMI 18.61 kg/m²  Not found.  General- NAD, alert and oriented, appropriate  Psych- Normal mood, good memory  Neck- No masses, no thyroid enlargement  CV- Regular rhythm, no murnurs  Resp- CTA to bases, no wheezes  Abdomen- Soft, non distended, non tender, no masses    Breast left-  Bilaterally symmetrical, no masses, non tender, no nipple discharge  Breast right- Bilaterally symmetrical, no masses, non tender, no nipple discharge    External genitalia- Normal female, no lesions  Urethra/meatus- Normal, no masses, non tender  Bladder- Normal, no masses, non tender  Vagina- Normal, no atrophy, no lesions, no discharge.    Cvx- Normal, no lesions, no discharge, No cervical motion tenderness  Uterus- Normal size, shape & consistency.  Non tender, mobile, & no prolapse  Adnexa- No mass, non tender  Anus/Rectum/Perineum- Not performed    Lymphatic- No palpable neck, axillary, or groin nodes  Ext- No edema, no cyanosis    Skin- No lesions, no rashes, no acanthosis nigricans      ASSESSMENT and PLAN:    Diagnoses and all orders for this visit:    1. Well woman exam (no gynecological exam) (Primary)  -     Mammo Screening Digital Tomosynthesis Bilateral With CAD; Future  -     IgP, Aptima HPV    2. Postcoital bleeding  -     Gardnerella vaginalis, Trichomonas vaginalis, Candida albicans, DNA - Swab, Vagina  -     Chlamydia trachomatis, Neisseria  gonorrhoeae, PCR - Swab, Cervix    3. Perimenopause  Assessment & Plan:  Patient continues to have some vasomotor symptoms of menopause particularly night sweats and worsening anxiety.  Patient is not quite ready to consider hormone replacement therapy although we did discuss it briefly.  Patient was counseled at length regarding symptoms of perimenopause and that they can be present for 1 to 7 years prior to menopause.  Menopause is also a clinical diagnosis which is more difficult in patients who have had an endometrial ablation.  At this time the patient would like to try Effexor for the treatment of hot flashes night sweats and mood swings.    Orders:  -     venlafaxine XR (Effexor XR) 37.5 MG 24 hr capsule; Take 1 capsule by mouth Daily.  Dispense: 30 capsule; Refill: 11      Preventative:  • BREAST HEALTH- Monthly self breast exam importance and how to reviewed. MMG and/or MRI (prn) reviewed per society guidelines and her individual history. Screen: Pt will call to schedule  • CERVICAL CANCER Screening- Reviewed current ASCCP guidelines for screening w and wo cotest HPV, age specific.  Screen: Updated today  • COLON CANCER Screening- Reviewed current medical society guidelines and options.  Screen:  Pt will call to schedule  • Follow up PCP/Specialist PMHx and Labs    Perimenopause  She understands the importance of having any ordered tests to be performed in a timely fashion.  The risks of not performing them include, but are not limited to, advanced cancer stages, bone loss from osteoporosis and/or subsequent increase in morbidity and/or mortality.  She is encouraged to review her results online and/or contact or office if she has questions.     Follow Up:  Return in about 6 weeks (around 5/16/2023).            Giovanna Polanco MD  04/04/2023    [facility]  [address] [place] [address]  Dept: [phone]  Dept Fax: [phone]  Loc:  [phone]  Loc Fax: [phone]

## 2023-04-04 ENCOUNTER — OFFICE VISIT (OUTPATIENT)
Dept: OBSTETRICS AND GYNECOLOGY | Facility: CLINIC | Age: 48
End: 2023-04-04
Payer: COMMERCIAL

## 2023-04-04 VITALS
HEART RATE: 73 BPM | BODY MASS INDEX: 18.61 KG/M2 | WEIGHT: 108.4 LBS | SYSTOLIC BLOOD PRESSURE: 108 MMHG | DIASTOLIC BLOOD PRESSURE: 74 MMHG

## 2023-04-04 DIAGNOSIS — Z00.00 WELL WOMAN EXAM (NO GYNECOLOGICAL EXAM): Primary | ICD-10-CM

## 2023-04-04 DIAGNOSIS — N93.0 POSTCOITAL BLEEDING: ICD-10-CM

## 2023-04-04 DIAGNOSIS — N95.1 PERIMENOPAUSE: ICD-10-CM

## 2023-04-04 LAB
C TRACH RRNA CVX QL NAA+PROBE: NOT DETECTED
CANDIDA SPECIES: NEGATIVE
GARDNERELLA VAGINALIS: NEGATIVE
N GONORRHOEA RRNA SPEC QL NAA+PROBE: NOT DETECTED
T VAGINALIS DNA VAG QL PROBE+SIG AMP: NEGATIVE

## 2023-04-04 PROCEDURE — 87660 TRICHOMONAS VAGIN DIR PROBE: CPT | Performed by: OBSTETRICS & GYNECOLOGY

## 2023-04-04 PROCEDURE — 87510 GARDNER VAG DNA DIR PROBE: CPT | Performed by: OBSTETRICS & GYNECOLOGY

## 2023-04-04 PROCEDURE — G0123 SCREEN CERV/VAG THIN LAYER: HCPCS | Performed by: OBSTETRICS & GYNECOLOGY

## 2023-04-04 PROCEDURE — 87491 CHLMYD TRACH DNA AMP PROBE: CPT | Performed by: OBSTETRICS & GYNECOLOGY

## 2023-04-04 PROCEDURE — 87480 CANDIDA DNA DIR PROBE: CPT | Performed by: OBSTETRICS & GYNECOLOGY

## 2023-04-04 PROCEDURE — 87591 N.GONORRHOEAE DNA AMP PROB: CPT | Performed by: OBSTETRICS & GYNECOLOGY

## 2023-04-04 PROCEDURE — 87624 HPV HI-RISK TYP POOLED RSLT: CPT | Performed by: OBSTETRICS & GYNECOLOGY

## 2023-04-04 RX ORDER — VENLAFAXINE HYDROCHLORIDE 37.5 MG/1
37.5 CAPSULE, EXTENDED RELEASE ORAL DAILY
Qty: 30 CAPSULE | Refills: 11 | Status: SHIPPED | OUTPATIENT
Start: 2023-04-04

## 2023-04-06 NOTE — ASSESSMENT & PLAN NOTE
Patient continues to have some vasomotor symptoms of menopause particularly night sweats and worsening anxiety.  Patient is not quite ready to consider hormone replacement therapy although we did discuss it briefly.  Patient was counseled at length regarding symptoms of perimenopause and that they can be present for 1 to 7 years prior to menopause.  Menopause is also a clinical diagnosis which is more difficult in patients who have had an endometrial ablation.  At this time the patient would like to try Effexor for the treatment of hot flashes night sweats and mood swings.

## 2023-04-12 LAB
CYTOLOGIST CVX/VAG CYTO: NORMAL
CYTOLOGY CVX/VAG DOC CYTO: NORMAL
CYTOLOGY CVX/VAG DOC THIN PREP: NORMAL
DX ICD CODE: NORMAL
HIV 1 & 2 AB SER-IMP: NORMAL
HPV I/H RISK 4 DNA CVX QL PROBE+SIG AMP: NEGATIVE
OTHER STN SPEC: NORMAL
STAT OF ADQ CVX/VAG CYTO-IMP: NORMAL

## 2024-03-28 NOTE — PROGRESS NOTES
"Well Woman Visit    CC: Scheduled annual well gyn visit  Chief Complaint   Patient presents with    Gynecologic Exam       Myriad intake in the past?: No    DONE TODAY DID NOT QUALIFY TODAY    Contraception:  Vasectomy   Mammo Screening Digital Tomosynthesis Bilateral With CAD (2023 11:32)    PDF Report (2023 14:43)  HPI:   49 y.o. s/p endometrial ablation        PCP: does manage PMHx and preventative labs  History: PMHx, Meds, Allergies, PSHx, Social Hx, and POBHx all reviewed and updated.    Pt is c/o hot flashes for the past 6-7 months.  She had 3 months without symptoms and for the last 2 months have come back with a vengence .  She is also having joint pain, particularly right shoulder pain, Headaches, anxiety, OAB symptoms and fatigue    PHYSICAL EXAM:  /78   Pulse 80   Ht 162.6 cm (64\")   Wt 53.1 kg (117 lb)   LMP  (LMP Unknown)   BMI 20.08 kg/m²  Not found.  General- NAD, alert and oriented, appropriate  Psych- Normal mood, good memory  Neck- No masses, no thyroid enlargement  CV- Regular rhythm, no murnurs  Resp- CTA to bases, no wheezes  Abdomen- Soft, non distended, non tender, no masses    Breast left-  Bilaterally symmetrical, no masses, non tender, no nipple discharge  Breast right- Bilaterally symmetrical, no masses, non tender, no nipple discharge    External genitalia- Normal female, no lesions, mild vulvovaginal atrophy with intact vulvar architecture  Urethra/meatus- Normal, no masses, non tender  Bladder- Normal, no masses, non tender  Vagina- Normal, mild atrophy, no lesions, no discharge.    Cvx- Normal, no lesions, no discharge, No cervical motion tenderness  Uterus- Normal size, shape & consistency.  Non tender, mobile.  Adnexa- No mass, non tender  Anus/Rectum/Perineum- Not performed    Lymphatic- No palpable neck, axillary, or groin nodes  Ext- No edema, no cyanosis    Skin- No lesions, no rashes, no acanthosis nigricans      ASSESSMENT and PLAN:    Diagnoses " and all orders for this visit:    1. Well woman exam (no gynecological exam) (Primary)    2. Encounter for screening mammogram for malignant neoplasm of breast  -     Mammo Screening Digital Tomosynthesis Bilateral With CAD; Future    3. Perimenopause  Assessment & Plan:  Pt is c/o hot flashes for the past 6-7 months.  She had 3 months without symptoms and for the last 2 months have come back with a vengence.  She is also having joint pain, particularly right shoulder pain, Headaches, anxiety, OAB symptoms and fatigue  We discussed the clinical diagnosis of perimenopause and menopause.  Patient had similar symptoms at the time of her annual exam last year and at that time we did a trial of Effexor XR 37.5 mg.  Patient did not notice any significant improvement.  We reviewed the NAMS 2022 position statement on MHT.  We discussed how MHT is the gold standard to prevent osteoporosis and patient has several risk factors for osteoporosis.  Will do a trial of transdermal estrogen patches and oral micronized Prometrium    Orders:  -     Progesterone (Prometrium) 100 MG capsule; Take 1 capsule by mouth Daily.  Dispense: 90 capsule; Refill: 3  -     estradiol (Vagifem) 10 MCG tablet vaginal tablet; Insert 1 tablet into the vagina 2 (Two) Times a Week.  Dispense: 8 tablet; Refill: 11  -     estradiol (Minivelle) 0.0375 MG/24HR patch; Place 1 patch on the skin as directed by provider 2 (Two) Times a Week.  Dispense: 8 patch; Refill: 11    4. HSV infection  Comments:  Patient has had 1 outbreak in the past 3 years and requests a prescription for Valtrex as needed  Orders:  -     valACYclovir (VALTREX) 500 MG tablet; Take 1 tablet by mouth 2 (Two) Times a Day.  Dispense: 6 tablet; Refill: 5        Preventative:  BREAST HEALTH- Monthly self breast exam importance and how to reviewed. MMG and/or MRI (prn) reviewed per society guidelines and her individual history. Screen: Pt will call to schedule  CERVICAL CANCER Screening-  Reviewed current ASCCP guidelines for screening w and wo cotest HPV, age specific.  Screen: Already up to date  COLON CANCER Screening- Reviewed current medical society guidelines and options.  Screen:  Pt will call to schedule  Follow up PCP/Specialist PMHx and/or Labs    HRT R/B/A/SE/E all options including non-FDA approved options discussed w pt.         She understands the importance of having any ordered tests to be performed in a timely fashion.  The risks of not performing them include, but are not limited to, advanced cancer stages, bone loss from osteoporosis and/or subsequent increase in morbidity and/or mortality.  She is encouraged to review her results online and/or contact or office if she has questions.     Follow Up:  Return in about 6 weeks (around 5/20/2024).    I spent 22 minutes on the separately reported service of perimenopause and GSM. This time is not included in the time used to support the E/M service also reported today.        Giovanna Polanco MD  04/08/2024    [facility]  [facility] [place] [address]  Dept: [phone]  Dept Fax: [phone]  Loc: [phone]  Loc Fax: [phone]

## 2024-04-02 ENCOUNTER — TRANSCRIBE ORDERS (OUTPATIENT)
Dept: ADMINISTRATIVE | Facility: HOSPITAL | Age: 49
End: 2024-04-02
Payer: COMMERCIAL

## 2024-04-02 DIAGNOSIS — K22.0 ACHALASIA: ICD-10-CM

## 2024-04-02 DIAGNOSIS — R47.02 DYSPHASIA: Primary | ICD-10-CM

## 2024-04-02 DIAGNOSIS — R11.11 VOMITING WITHOUT NAUSEA, UNSPECIFIED VOMITING TYPE: ICD-10-CM

## 2024-04-02 DIAGNOSIS — R13.10 DYSPHAGIA, UNSPECIFIED TYPE: ICD-10-CM

## 2024-04-08 ENCOUNTER — OFFICE VISIT (OUTPATIENT)
Dept: OBSTETRICS AND GYNECOLOGY | Facility: CLINIC | Age: 49
End: 2024-04-08
Payer: COMMERCIAL

## 2024-04-08 VITALS
DIASTOLIC BLOOD PRESSURE: 78 MMHG | WEIGHT: 117 LBS | BODY MASS INDEX: 19.97 KG/M2 | HEIGHT: 64 IN | SYSTOLIC BLOOD PRESSURE: 108 MMHG | HEART RATE: 80 BPM

## 2024-04-08 DIAGNOSIS — N95.1 PERIMENOPAUSE: ICD-10-CM

## 2024-04-08 DIAGNOSIS — B00.9 HSV INFECTION: ICD-10-CM

## 2024-04-08 DIAGNOSIS — Z12.31 ENCOUNTER FOR SCREENING MAMMOGRAM FOR MALIGNANT NEOPLASM OF BREAST: ICD-10-CM

## 2024-04-08 DIAGNOSIS — Z00.00 WELL WOMAN EXAM (NO GYNECOLOGICAL EXAM): Primary | ICD-10-CM

## 2024-04-08 PROBLEM — N93.0 POSTCOITAL BLEEDING: Status: RESOLVED | Noted: 2023-04-04 | Resolved: 2024-04-08

## 2024-04-08 PROBLEM — N95.8 GENITOURINARY SYNDROME OF MENOPAUSE: Status: ACTIVE | Noted: 2024-04-08

## 2024-04-08 PROBLEM — N93.0 POSTCOITAL BLEEDING: Status: RESOLVED | Noted: 2022-09-29 | Resolved: 2024-04-08

## 2024-04-08 PROCEDURE — 99214 OFFICE O/P EST MOD 30 MIN: CPT | Performed by: OBSTETRICS & GYNECOLOGY

## 2024-04-08 PROCEDURE — 99396 PREV VISIT EST AGE 40-64: CPT | Performed by: OBSTETRICS & GYNECOLOGY

## 2024-04-08 RX ORDER — ESTRADIOL 0.04 MG/D
1 FILM, EXTENDED RELEASE TRANSDERMAL 2 TIMES WEEKLY
Qty: 8 PATCH | Refills: 11 | Status: SHIPPED | OUTPATIENT
Start: 2024-04-08 | End: 2025-04-08

## 2024-04-08 RX ORDER — PROGESTERONE 100 MG/1
100 CAPSULE ORAL DAILY
Qty: 90 CAPSULE | Refills: 3 | Status: SHIPPED | OUTPATIENT
Start: 2024-04-08

## 2024-04-08 RX ORDER — VALACYCLOVIR HYDROCHLORIDE 500 MG/1
500 TABLET, FILM COATED ORAL 2 TIMES DAILY
Qty: 6 TABLET | Refills: 5 | Status: SHIPPED | OUTPATIENT
Start: 2024-04-08

## 2024-04-08 RX ORDER — ESTRADIOL 10 UG/1
1 INSERT VAGINAL 2 TIMES WEEKLY
Qty: 8 TABLET | Refills: 11 | Status: SHIPPED | OUTPATIENT
Start: 2024-04-08

## 2024-04-08 NOTE — ASSESSMENT & PLAN NOTE
Pt is c/o hot flashes for the past 6-7 months.  She had 3 months without symptoms and for the last 2 months have come back with a vengence.  She is also having joint pain, particularly right shoulder pain, Headaches, anxiety, OAB symptoms and fatigue  We discussed the clinical diagnosis of perimenopause and menopause.  Patient had similar symptoms at the time of her annual exam last year and at that time we did a trial of Effexor XR 37.5 mg.  Patient did not notice any significant improvement.  We reviewed the NAMS 2022 position statement on MHT.  We discussed how MHT is the gold standard to prevent osteoporosis and patient has several risk factors for osteoporosis.  Will do a trial of transdermal estrogen patches and oral micronized Prometrium

## 2024-04-08 NOTE — ASSESSMENT & PLAN NOTE
Patient with complaints of urinary frequency and urgency with occasional leakage.  On exam patient has mild vulvovaginal atrophy with intact vulvar architecture.  Patient was counseled regarding the risks benefits of localized vaginal estrogen therapy

## 2024-04-08 NOTE — ASSESSMENT & PLAN NOTE
Pt is c/o hot flashes for the past 6-7 months.  She had 3 months without symptoms and for the last 2 months have come back with a vengence.  She is also having joint pain, particularly right shoulder pain, Headaches, anxiety, OAB symptoms and fatigue  Counseled patient regarding clinical diagnosis of perimenopause and menopause.  Given the patient's symptoms have been present for several months and then improved for several months it is most likely that she is in perimenopause.  Patient is s/p endometrial ablation with subsequent amenorrhea.  Patient was counseled at length regarding the risks benefits of MHT for perimenopause.  We discussed how MHT is the gold standard to prevent osteoporosis.  Patient is at risk for osteoporosis.

## 2024-04-16 ENCOUNTER — HOSPITAL ENCOUNTER (OUTPATIENT)
Dept: GENERAL RADIOLOGY | Facility: HOSPITAL | Age: 49
Discharge: HOME | End: 2024-04-16
Admitting: THORACIC SURGERY (CARDIOTHORACIC VASCULAR SURGERY)
Payer: COMMERCIAL

## 2024-04-16 DIAGNOSIS — R47.02 DYSPHASIA: ICD-10-CM

## 2024-04-16 DIAGNOSIS — R13.10 DYSPHAGIA, UNSPECIFIED TYPE: ICD-10-CM

## 2024-04-16 DIAGNOSIS — K22.0 ACHALASIA: ICD-10-CM

## 2024-04-16 DIAGNOSIS — R11.11 VOMITING WITHOUT NAUSEA, UNSPECIFIED VOMITING TYPE: ICD-10-CM

## 2024-04-16 PROCEDURE — 74246 X-RAY XM UPR GI TRC 2CNTRST: CPT

## 2024-04-16 RX ADMIN — ANTACID/ANTIFLATULENT 0.5 PACKET: 380; 550; 10; 10 GRANULE, EFFERVESCENT ORAL at 11:55

## 2024-04-16 RX ADMIN — BARIUM SULFATE 135 ML: 980 POWDER, FOR SUSPENSION ORAL at 11:54

## 2024-04-16 RX ADMIN — BARIUM SULFATE 355 ML: 0.6 SUSPENSION ORAL at 11:55

## 2024-04-16 RX ADMIN — BARIUM SULFATE 700 MG: 700 TABLET ORAL at 11:54

## 2024-04-19 ENCOUNTER — TELEPHONE (OUTPATIENT)
Dept: OBSTETRICS AND GYNECOLOGY | Facility: CLINIC | Age: 49
End: 2024-04-19
Payer: COMMERCIAL

## 2024-04-19 NOTE — TELEPHONE ENCOUNTER
UNABLE TO CONTACT / TRIED 3 TIMES 4/10/24, 4/11/24 & 4/12/24 ( SEE REFERRAL ) / LETTER MAILED TO ADDRESS ON FILE / REFERRAL TO MAMMO CLOSED

## 2024-05-28 NOTE — PROGRESS NOTES
GYN Visit    CC: Perimenopause    HPI:   49 y.o. Contraception or HRT: Contraception:  Vasectomy   Amenorrhea since ablation    Patient was started on combination MHT for perimenopause at her last appointment as well as localized vaginal estrogen therapy .  Started the minivelle and prometrium and felt emotionally disconnected.  She states she has never felt like this before.  She had profound depression and intrusive thoughts.  She also began having vaginal bleeding which was daily and persistent.  Patient stopped both Minivelle and the Prometrium and her symptoms resolved.  She states while on the Minivelle and Prometrium she did have improved sleep and resolution of VMS      History: PMHx, Meds, Allergies, PSHx, Social Hx, and POBHx all reviewed and updated.  Physical Exam   PHYSICAL EXAM:  BP 97/71   Pulse 62   Wt 54.4 kg (120 lb)   Breastfeeding No   BMI 20.60 kg/m²   General- NAD, alert and oriented, appropriate  Psych- Normal mood, good memory    ASSESSMENT AND PLAN:  Diagnoses and all orders for this visit:    1. Perimenopause (Primary)  Assessment & Plan:  Patient started on low-dose Minivelle 0.0375 mg/24-hour patch.  She states she immediately noticed severe depression and mental disconnect.  She states she has never felt like this before.  She states profound depression was present which was alarming and she had some intrusive thoughts with the Minivelle and the Prometrium.  She also had some bleeding which was daily and persistent.  Patient had previously been amenorrheic since her endometrial ablation.  Patient stopped the Minivelle and Prometrium and her symptoms resolved.  Patient did initially have resolution of EMS and those have come back.  At this time we will change to oral estradiol and continue the Prometrium.  If patient has a similar mental response to oral estradiol then the neck step would be to try a different form of estrogen supplementation for VMS.    Orders:  -      estradiol (ESTRACE) 0.5 MG tablet; Take 1 tablet by mouth Daily.  Dispense: 90 tablet; Refill: 3    2. Genitourinary syndrome of menopause  Assessment & Plan:  Patient states she is doing well with Vagifem and would like to continue.          Counseling:         Follow Up:  Return in about 3 months (around 8/29/2024).          Giovanna Polanco MD  05/29/2024

## 2024-05-29 ENCOUNTER — OFFICE VISIT (OUTPATIENT)
Dept: OBSTETRICS AND GYNECOLOGY | Facility: CLINIC | Age: 49
End: 2024-05-29
Payer: COMMERCIAL

## 2024-05-29 VITALS
BODY MASS INDEX: 20.6 KG/M2 | DIASTOLIC BLOOD PRESSURE: 71 MMHG | SYSTOLIC BLOOD PRESSURE: 97 MMHG | WEIGHT: 120 LBS | HEART RATE: 62 BPM

## 2024-05-29 DIAGNOSIS — N95.8 GENITOURINARY SYNDROME OF MENOPAUSE: ICD-10-CM

## 2024-05-29 DIAGNOSIS — N95.1 PERIMENOPAUSE: Primary | ICD-10-CM

## 2024-05-29 RX ORDER — ESTRADIOL 0.5 MG/1
0.5 TABLET ORAL DAILY
Qty: 90 TABLET | Refills: 3 | Status: SHIPPED | OUTPATIENT
Start: 2024-05-29

## 2024-05-29 NOTE — ASSESSMENT & PLAN NOTE
Patient started on low-dose Minivelle 0.0375 mg/24-hour patch.  She states she immediately noticed severe depression and mental disconnect.  She states she has never felt like this before.  She states profound depression was present which was alarming and she had some intrusive thoughts with the Minivelle and the Prometrium.  She also had some bleeding which was daily and persistent.  Patient had previously been amenorrheic since her endometrial ablation.  Patient stopped the Minivelle and Prometrium and her symptoms resolved.  Patient did initially have resolution of EMS and those have come back.  At this time we will change to oral estradiol and continue the Prometrium.  If patient has a similar mental response to oral estradiol then the neck step would be to try a different form of estrogen supplementation for VMS.

## 2024-06-18 RX ORDER — ONDANSETRON 4 MG/1
4 TABLET, ORALLY DISINTEGRATING ORAL EVERY 8 HOURS PRN
Qty: 12 TABLET | Refills: 0 | Status: SHIPPED | OUTPATIENT
Start: 2024-06-18

## 2024-09-06 NOTE — PROGRESS NOTES
GYN Visit    CC: Perimenopause    HPI:   49 y.o. Contraception or HRT: Contraception:  Vasectomy     Patient states her bleeding has stopped.  She restarted the oral estrogen 2 weeks prior but has not yet restarted the Prometrium    History: PMHx, Meds, Allergies, PSHx, Social Hx, and POBHx all reviewed and updated.  Physical Exam   PHYSICAL EXAM:  /75   Pulse 55   Wt 56.2 kg (124 lb)   Breastfeeding No   BMI 21.28 kg/m²   General- NAD, alert and oriented, appropriate  Psych- Normal mood, good memory      ASSESSMENT AND PLAN:  Diagnoses and all orders for this visit:    1. Perimenopause (Primary)  Assessment & Plan:  Pt started bleeding on transdermal estrogen plus progesterone  Had terrible depression and almost suicidal thoughts on the patch.  Stopped the patch and the progesterone and bleeding resolved.  Started oral estrogen 2 weeks ago but hasn't yet restarted the prometrium because she was concerned about bleeding  She is now having recurrence of brain fog, insomnia, anxiety and chest palpitations  Counseled the patient at length regarding the importance of protection of the endometrium with progesterone while taking estrogen for MHT.  Will increase the patient's dose of estradiol p.o. daily.  Will also increase the Prometrium dose    Orders:  -     estradiol (ESTRACE) 1 MG tablet; Take 1 tablet by mouth Daily.  Dispense: 90 tablet; Refill: 3  -     Progesterone (Prometrium) 200 MG capsule; Take 1 capsule by mouth Daily.  Dispense: 90 capsule; Refill: 3    2. Genitourinary syndrome of menopause  Assessment & Plan:  Patient states she actually does not feel like the Vagifem is working and she continues to have urinary frequency and urgency.  Patient was counseled regarding vaginal estrogen cream and how best to apply it.    Orders:  -     estradiol (ESTRACE VAGINAL) 0.1 MG/GM vaginal cream; Place 0.5 gm vaginally and massage 0.5 gm to vulva and vestibule at night twice weekly  Dispense: 42.5 g;  Refill: 3        Counseling: HRT/ERT- newer (as of 2024) evidence along with in depth review of WHI results supports the use of iso-molecular (identical to human) estradiol for osteoporosis prevention, treatment of vasomotor symptoms and prevention of coronary vascular disease and premature death when started within 10 years of menopause.  For patients who have not had a hysterectomy, the addition of iso-molecular micronized progesterone further enhances bone mineral density without an increase in breast cancer incidence or mortality.         Follow Up:  Return in about 2 months (around 11/9/2024).          Giovanna Polanco MD  09/09/2024

## 2024-09-09 ENCOUNTER — OFFICE VISIT (OUTPATIENT)
Dept: OBSTETRICS AND GYNECOLOGY | Facility: CLINIC | Age: 49
End: 2024-09-09
Payer: COMMERCIAL

## 2024-09-09 VITALS
DIASTOLIC BLOOD PRESSURE: 75 MMHG | BODY MASS INDEX: 21.28 KG/M2 | HEART RATE: 55 BPM | WEIGHT: 124 LBS | SYSTOLIC BLOOD PRESSURE: 119 MMHG

## 2024-09-09 DIAGNOSIS — N95.1 PERIMENOPAUSE: Primary | ICD-10-CM

## 2024-09-09 DIAGNOSIS — N95.8 GENITOURINARY SYNDROME OF MENOPAUSE: ICD-10-CM

## 2024-09-09 PROCEDURE — 99213 OFFICE O/P EST LOW 20 MIN: CPT | Performed by: OBSTETRICS & GYNECOLOGY

## 2024-09-09 RX ORDER — PROGESTERONE 200 MG/1
200 CAPSULE ORAL DAILY
Qty: 90 CAPSULE | Refills: 3 | Status: SHIPPED | OUTPATIENT
Start: 2024-09-09

## 2024-09-09 RX ORDER — ESTRADIOL 0.1 MG/G
CREAM VAGINAL
Qty: 42.5 G | Refills: 3 | Status: SHIPPED | OUTPATIENT
Start: 2024-09-09

## 2024-09-09 RX ORDER — ESTRADIOL 1 MG/1
1 TABLET ORAL DAILY
Qty: 90 TABLET | Refills: 3 | Status: SHIPPED | OUTPATIENT
Start: 2024-09-09

## 2024-09-09 NOTE — ASSESSMENT & PLAN NOTE
Pt started bleeding on transdermal estrogen plus progesterone  Had terrible depression and almost suicidal thoughts on the patch.  Stopped the patch and the progesterone and bleeding resolved.  Started oral estrogen 2 weeks ago but hasn't yet restarted the prometrium because she was concerned about bleeding  She is now having recurrence of brain fog, insomnia, anxiety and chest palpitations  Counseled the patient at length regarding the importance of protection of the endometrium with progesterone while taking estrogen for MHT.  Will increase the patient's dose of estradiol p.o. daily.  Will also increase the Prometrium dose

## 2024-09-09 NOTE — ASSESSMENT & PLAN NOTE
Patient states she actually does not feel like the Vagifem is working and she continues to have urinary frequency and urgency.  Patient was counseled regarding vaginal estrogen cream and how best to apply it.

## 2024-11-18 ENCOUNTER — TELEPHONE (OUTPATIENT)
Dept: OBSTETRICS AND GYNECOLOGY | Facility: CLINIC | Age: 49
End: 2024-11-18
Payer: COMMERCIAL

## 2025-04-07 NOTE — PROGRESS NOTES
Well Woman Visit    CC: Scheduled annual well gyn visit  Chief Complaint   Patient presents with    Annual Exam     Discuss menopause meds.          Contraception:  Vasectomy     Last Completed Pap Smear            Upcoming       PAP SMEAR (Every 3 Years) Next due on 2023  IgP, Aptima HPV    2022  IgP, Aptima HPV                           Last Completed Mammogram    This patient has no relevant Health Maintenance data.          HPI:   50 y.o.     History of Present Illness  The patient presents for annual exam and evaluation of menopausal symptoms, frozen shoulder, and seasonal affective disorder.    She reports experiencing bleeding, which she attributes to her hormone therapy. She has not experienced any bleeding since discontinuing the medication. She also reports a recurrence of urinary frequency and hot flashes, which had previously subsided. She has been on hormone therapy since 2024, initially starting with a patch before transitioning to oral medication. An increase in progesterone to 200 mg resulted in bleeding, prompting her to discontinue the medication.  She did not tolerate transdermal estrogen well and was changed to p.o. estrogen which was better for her mental health.  She continued to have persistent bleeding since she stopped her MHT and bleeding subsequently resolved.  She continues to use vulvovaginal estrogen cream intermittently.    She experiences urinary frequency and is uncertain whether the discharge she experiences is urine or another substance. She has been performing Kegel exercises and requests a refill of her vaginal estrogen prescription.    She reports a lack of sexual desire and feelings of depression, which she believes are related to her hormone therapy. These symptoms improved upon switching from the pill to another form of hormone therapy.    She has not experienced any issues since her last Valtrex prescription and does not currently have  any on hand.    She reports a history of frozen shoulder, which she has not sought physical therapy for, and is interested in exploring medication options before considering an MRI.    She also reports worsening seasonal affective disorder during the winter months.    MEDICATIONS  Current: Estrogen cream, Prometrium, Valtrex     PCP: does manage PMHx and preventative labs  History: PMHx, Meds, Allergies, PSHx, Social Hx, and POBHx all reviewed and updated.    PHYSICAL EXAM:  /66   Wt 55.3 kg (122 lb)   BMI 20.94 kg/m²  Not found.  General- NAD, alert and oriented, appropriate  Psych- Normal mood, good memory  Neck- No masses, no thyroid enlargement  CV- Regular rhythm, no murnurs  Resp- CTA to bases, no wheezes  Abdomen- Soft, non distended, non tender, no masses  Breast left-  Bilaterally symmetrical, no masses, non tender, no nipple discharge  Breast right- Bilaterally symmetrical, no masses, non tender, no nipple discharge  External genitalia- Normal female, no lesions, mild vulvovaginal atrophy with intact vulvar architecture  Urethra/meatus- Normal, no masses, non tender  Bladder- Normal, no masses, non tender  Vagina- Normal, mild atrophy, no lesions, no discharge.    Cvx- Normal, no lesions, no discharge, No cervical motion tenderness  Uterus- Normal size, shape & consistency.  Non tender, mobile.  Adnexa- No mass, non tender  Anus/Rectum/Perineum- Not performed  Lymphatic- No palpable neck, axillary, or groin nodes  Ext- No edema, no cyanosis    Skin- No lesions, no rashes, no acanthosis nigricans      ASSESSMENT and PLAN:    Diagnoses and all orders for this visit:    1. Well woman exam with routine gynecological exam (Primary)    2. PMB (postmenopausal bleeding)  -     US Non-ob Transvaginal; Future    3. Perimenopause  -     Progesterone (Prometrium) 200 MG capsule; Take 1 capsule by mouth Daily. Take 1 PO at night on days 14-28 of the month  Dispense: 42 capsule; Refill: 3  -     estradiol  (ESTRACE) 1 MG tablet; Take 1 tablet by mouth Daily.  Dispense: 90 tablet; Refill: 3    4. Genitourinary syndrome of menopause  -     estradiol (ESTRACE VAGINAL) 0.1 MG/GM vaginal cream; Place 0.5 gm vaginally and massage 0.5 gm to vulva and vestibule at night twice weekly  Dispense: 42.5 g; Refill: 3    5. Encounter for screening mammogram for malignant neoplasm of breast  -     Mammo Screening Digital Tomosynthesis Bilateral With CAD; Future    6. HSV infection  Comments:  No recent outbreaks  Orders:  -     valACYclovir (VALTREX) 500 MG tablet; Take 1 tablet by mouth 2 (Two) Times a Day.  Dispense: 6 tablet; Refill: 5        Assessment & Plan  1. Menopausal symptoms.  The bleeding is likely due to alterations in her medication regimen. It is possible that she is not postmenopausal, which could explain the bleeding when on daily progesterone. The goal is to find a combination of medications that effectively manages her symptoms. An ultrasound will be ordered to evaluate the uterine lining. She will restart her medications for menopause, including taking an estrogen pill daily and progesterone from the 14th to the 28th of each month at night. The vaginal estrogen will be continued twice weekly. Once the bleeding is resolved, her libido will be addressed.    2. Urinary frequency.  She reports urinary frequency and dribbling, which may be related to menopausal changes. She will continue using vaginal estrogen twice weekly to alleviate these symptoms.  Likely secondary to GSM    3. Frozen shoulder.  She has not tried physical therapy for her frozen shoulder. If symptoms persist, physical therapy or imaging (MRI) will be considered.  Not had an official diagnosis of frozen shoulder.  If symptoms persist after restarting MHT, patient will need appropriate evaluation and management    4. Seasonal affective disorder.  She reports worsening seasonal affective disorder during the winter months. If necessary, antianxiety or  antidepressant medications will be added, or light therapy will be considered.    5. Medication management.  A refill for Valtrex will be provided to have on hand.       Preventative:  BREAST HEALTH- Monthly self breast exam importance and how to reviewed. MMG and/or MRI (prn) reviewed per society guidelines and her individual history. Screen: Pt will call to schedule  CERVICAL CANCER Screening- Reviewed current ASCCP guidelines for screening w and wo cotest HPV, age specific.  Screen: Already up to date  COLON CANCER Screening- Reviewed current medical society guidelines and options.  Screen:  Pt will call to schedule  Follow up PCP/Specialist PMHx and/or Labs      She understands the importance of having any ordered tests to be performed in a timely fashion.  The risks of not performing them include, but are not limited to, advanced cancer stages, bone loss from osteoporosis and/or subsequent increase in morbidity and/or mortality.  She is encouraged to review her results online and/or contact or office if she has questions.     Follow Up:  Return for post ultrasound.            Giovanna Polanco MD  04/09/2025    [facility] [place] [facility] [address] [facility]  [address] [place] [address] [address] [place] [address]  Dept: [phone]  Dept Fax: [phone]  Loc: [phone]  Loc Fax: [phone]     Patient or patient representative verbalized consent for the use of Ambient Listening during the visit with  Giovanna Polanco MD for chart documentation. 4/9/2025  09:35 EDT

## 2025-04-09 ENCOUNTER — OFFICE VISIT (OUTPATIENT)
Dept: OBSTETRICS AND GYNECOLOGY | Age: 50
End: 2025-04-09
Payer: COMMERCIAL

## 2025-04-09 VITALS — SYSTOLIC BLOOD PRESSURE: 104 MMHG | BODY MASS INDEX: 20.94 KG/M2 | WEIGHT: 122 LBS | DIASTOLIC BLOOD PRESSURE: 66 MMHG

## 2025-04-09 DIAGNOSIS — Z12.31 ENCOUNTER FOR SCREENING MAMMOGRAM FOR MALIGNANT NEOPLASM OF BREAST: ICD-10-CM

## 2025-04-09 DIAGNOSIS — N95.1 PERIMENOPAUSE: ICD-10-CM

## 2025-04-09 DIAGNOSIS — N95.8 GENITOURINARY SYNDROME OF MENOPAUSE: ICD-10-CM

## 2025-04-09 DIAGNOSIS — Z01.419 WELL WOMAN EXAM WITH ROUTINE GYNECOLOGICAL EXAM: Primary | ICD-10-CM

## 2025-04-09 DIAGNOSIS — B00.9 HSV INFECTION: ICD-10-CM

## 2025-04-09 DIAGNOSIS — N95.0 PMB (POSTMENOPAUSAL BLEEDING): ICD-10-CM

## 2025-04-09 RX ORDER — PROGESTERONE 200 MG/1
200 CAPSULE ORAL DAILY
Qty: 42 CAPSULE | Refills: 3 | Status: SHIPPED | OUTPATIENT
Start: 2025-04-09

## 2025-04-09 RX ORDER — ESTRADIOL 1 MG/1
1 TABLET ORAL DAILY
Qty: 90 TABLET | Refills: 3 | Status: SHIPPED | OUTPATIENT
Start: 2025-04-09

## 2025-04-09 RX ORDER — VALACYCLOVIR HYDROCHLORIDE 500 MG/1
500 TABLET, FILM COATED ORAL 2 TIMES DAILY
Qty: 6 TABLET | Refills: 5 | Status: SHIPPED | OUTPATIENT
Start: 2025-04-09

## 2025-04-09 RX ORDER — ESTRADIOL 0.1 MG/G
CREAM VAGINAL
Qty: 42.5 G | Refills: 3 | Status: SHIPPED | OUTPATIENT
Start: 2025-04-09

## (undated) DEVICE — EGD OR ERCP KIT: Brand: MEDLINE INDUSTRIES, INC.

## (undated) DEVICE — GOWN,NON-REINFORCED,SIRUS,SET IN SLV,XXL: Brand: MEDLINE

## (undated) DEVICE — SOL IRRG H2O PL/BG 1000ML STRL

## (undated) DEVICE — 1000ML,PRESSURE INFUSER W/STOPCOCK: Brand: MEDLINE

## (undated) DEVICE — PROB ABL ENDOMTRL NOVASURE/G4 W/SURESND

## (undated) DEVICE — CATH URETH INTRMIT ALLPURP LTX 16F RED

## (undated) DEVICE — TOWEL,OR,DSP,ST,BLUE,STD,4/PK,20PK/CS: Brand: MEDLINE

## (undated) DEVICE — SOL IRR NACL 0.9PCT BT 1000ML

## (undated) DEVICE — SKIN PREP TRAY W/CHG: Brand: MEDLINE INDUSTRIES, INC.

## (undated) DEVICE — LIGHT SLEEVE: Brand: DEVON

## (undated) DEVICE — SLV SCD KN/LEN ADJ EXPRSS BLENDED MD 1P/U

## (undated) DEVICE — ESOPHAGEAL BALLOON DILATATION CATHETER: Brand: CRE FIXED WIRE

## (undated) DEVICE — DEV INFL CRE STERIFLATE 60CC DISP

## (undated) DEVICE — GLV SURG SENSICARE PI ORTHO SZ6.5 LF STRL

## (undated) DEVICE — Device: Brand: DEFENDO AIR/WATER/SUCTION AND BIOPSY VALVE

## (undated) DEVICE — SEAL HYSTERSCOPE/OUTFLOW CHANNEL MYOSURE

## (undated) DEVICE — LITHOTOMY-YELLOW FINS: Brand: MEDLINE INDUSTRIES, INC.